# Patient Record
Sex: FEMALE | Race: WHITE
[De-identification: names, ages, dates, MRNs, and addresses within clinical notes are randomized per-mention and may not be internally consistent; named-entity substitution may affect disease eponyms.]

---

## 2017-01-29 ENCOUNTER — HOSPITAL ENCOUNTER (OUTPATIENT)
Dept: HOSPITAL 62 - RAD | Age: 32
End: 2017-01-29
Attending: INTERNAL MEDICINE
Payer: COMMERCIAL

## 2017-01-29 DIAGNOSIS — C85.90: Primary | ICD-10-CM

## 2017-01-29 PROCEDURE — A9552 F18 FDG: HCPCS

## 2017-01-29 PROCEDURE — 78815 PET IMAGE W/CT SKULL-THIGH: CPT

## 2017-04-03 ENCOUNTER — HOSPITAL ENCOUNTER (OUTPATIENT)
Dept: HOSPITAL 62 - OD | Age: 32
End: 2017-04-03
Attending: PHYSICIAN ASSISTANT
Payer: COMMERCIAL

## 2017-04-03 DIAGNOSIS — M25.561: Primary | ICD-10-CM

## 2017-04-03 DIAGNOSIS — D16.21: ICD-10-CM

## 2017-05-08 ENCOUNTER — HOSPITAL ENCOUNTER (EMERGENCY)
Dept: HOSPITAL 62 - ER | Age: 32
Discharge: HOME | End: 2017-05-08
Payer: COMMERCIAL

## 2017-05-08 VITALS — DIASTOLIC BLOOD PRESSURE: 82 MMHG | SYSTOLIC BLOOD PRESSURE: 136 MMHG

## 2017-05-08 DIAGNOSIS — H53.149: ICD-10-CM

## 2017-05-08 DIAGNOSIS — E10.9: ICD-10-CM

## 2017-05-08 DIAGNOSIS — R51: Primary | ICD-10-CM

## 2017-05-08 DIAGNOSIS — F17.200: ICD-10-CM

## 2017-05-08 DIAGNOSIS — J34.89: ICD-10-CM

## 2017-05-08 PROCEDURE — 99284 EMERGENCY DEPT VISIT MOD MDM: CPT

## 2017-05-08 PROCEDURE — 96372 THER/PROPH/DIAG INJ SC/IM: CPT

## 2017-05-08 PROCEDURE — 70450 CT HEAD/BRAIN W/O DYE: CPT

## 2017-05-08 NOTE — ER DOCUMENT REPORT
HPI





- HPI


Patient complains to provider of: head and sinus pressure


Pain Level: 4


Context: 


Patient is a 31-year-old female presents emergency Department complaining of 

sinus pressure and headache for the past 2 months.  Patient states that she has 

seen her dentist as well as an urgent care who put her on 2 different 

antibiotics without any resolution of her symptoms.  Patient states that she 

has not had any fevers, chills, sinus discharge, pain to palpation of her 

sinuses.  Pain is a throbbing pressure worse over the right side of her face.  

She states that she'll take over-the-counter Tylenol or Motrin with minimal 

improvement in her symptoms.  She admits that she is light sensitive.  She 

denies any history of migraines.





PCP is Ahmet veloz





- CARDIOVASCULAR


Cardiovascular: DENIES: Chest pain





- REPRODUCTIVE


Reproductive: DENIES: Pregnant:





- DERM


Skin Color: Normal





Past Medical History





- Social History


Smoking Status: Current Every Day Smoker


Chew tobacco use (# tins/day): Yes


Frequency of alcohol use: None


Drug Abuse: None


Family History: Reviewed & Not Pertinent


Patient has suicidal ideation: No


Patient has homicidal ideation: No


Pulmonary Medical History: 


   Denies: Hx Tuberculosis


Neurological Medical History: Denies: Hx Seizures


Endocrine Medical History: Reports: Hx Diabetes Mellitus Type 1


Renal/ Medical History: Denies: Hx End Stage Renal Disease, Hx Kidney Stones, 

Hx Peritoneal Dialysis


GI Medical History: Reports: Hx Gastroesophageal Reflux Disease


Musculoskeltal Medical History: Reports Hx Gout


Past Surgical History: Reports: Hx Orthopedic Surgery - Back in 2013..  Denies: 

Hx Pacemaker





- Immunizations


Hx Diphtheria, Pertussis, Tetanus Vaccination: Yes





Vertical Provider Document





- CONSTITUTIONAL


Agree With Documented VS: Yes


Exam Limitations: No Limitations


General Appearance: WD/WN, No Apparent Distress


Notes: 


PHYSICAL EXAM


GENERAL: Alert, interacts well. 


HEAD: Normocephalic, atraumatic.


EYES: Pupils equal, round, and reactive to light. Extraocular movements intact.


ENT: Oral mucosa moist, tongue midline. 


NECK: Full range of motion. Supple. Trachea midline.


LUNGS: Clear to auscultation bilaterally, no wheezes, rales, or rhonchi. No 

respiratory distress.


HEART: Regular rate and rhythm. No murmurs, gallops, or rubs.


ABDOMEN: Soft,  nondistended, nontender. No guarding, rebound, or rigidity.. 

Bowel sounds present in all 4 quadrants.


EXTREMITIES: Moves all 4 extremities spontaneously. No edema, radial and 

dorsalis pedis pulses 2/4 bilaterally. No cyanosis. 


NEUROLOGICAL: Alert and oriented x4. Normal speech.


PSYCH: Normal affect, normal mood.


SKIN: Warm, dry, normal turgor. No rashes or lesions noted.





- INFECTION CONTROL


TRAVEL OUTSIDE OF THE [place] IN LAST 30 DAYS: No





- RESPIRATORY


O2 Sat by Pulse Oximetry: 95





Course





- Re-evaluation


Re-evalutation: 





05/08/17 14:58


CT the head without any evidence of hemorrhage, mass, inflammation of the 

sinuses.  Patient given a migraine cocktail which did improve her symptoms.  

Discussed with her to follow-up with her primary care for management and 

possible referral to a neurologist.





- Vital Signs


Vital signs: 


 











Temp Pulse Resp BP Pulse Ox


 


 98.4 F   108 H  16   147/104 H  95 


 


 05/08/17 11:19  05/08/17 11:19  05/08/17 11:19  05/08/17 11:19  05/08/17 11:19














- Diagnostic Test


Radiology reviewed: Image reviewed, Reports reviewed





Discharge





- Discharge


Clinical Impression: 


 Headache





Condition: Good


Disposition: HOME, SELF-CARE


Additional Instructions: 


HEADACHE:





     The physician does not feel that the headache you are experiencing has a 

serious underlying cause.  Most headaches are due to emotional stress, with 

resultant muscle tension (tension headache). Occasionally, headaches are 

secondary to changes in the blood vessels of the scalp (vascular headache and 

migraine headache).  Sometimes, a headache is the first symptom of another 

developing illness, such as a viral infection.  You have no evidence of stroke, 

bleeding, meningitis, or other serious cause of your headache.


     The treatment of headaches varies with the severity and cause of the pain.

  Not all headaches need pain shots.  In fact, there is evidence that using 

narcotics for headaches may make them worse in the long run.  The physician 

will determine the therapy that's in your best interest.


     If you develop a fever, if the headache is different from any you've 

previously experienced, or if the headache progressively worsens, then call 

your physician at once or go to the emergency room.








REGLAN (METOCLOPRAMIDE):


     Reglan has been prescribed.  This medicine affects the stomach and 

intestines.  It can be used to treat nausea and vomiting, to prevent reflux of 

stomach acid up into the esophagus, or to increase the contractions of the 

stomach and intestines.  It is often prescribed for esophagitis, and for 

paralysis of the stomach in diabetics.


     Reglan can cause either mild restlessness or drowsiness.  You should 

contact the doctor at once if you become extremely restless, anxious, or cannot 

sleep, or if you develop uncontrollable motions of the lips, tongue, or jaw.


     Do not take alcohol with this medicine.  Do not drive or operate machinery 

until you have been taking this medicine long enough to know how it affects you.


     Call the doctor if you develop abdominal pains, lightheadedness, black 

stool, or blood in the stool or vomitus.








USE OF DIPHENHYDRAMINE:


     Diphenhydramine (Benadryl) is an antihistamine and has been recommended to 

help treat your headache and to prevent side effects of other medications used 

to treat headaches.  The medication can be repeated four times daily.


     Age         Elixir (12.5 mg/tsp)         25 mg pill


     adult                                     1-2 tabs


     Antihistamines may cause drowsiness, especially with the first dose.  Do 

not operate machinery or drive while under the effects of the medication.  Do 

not combine the medication with alcohol, or with any other medication without 

talking to your doctor.








[place] MEDICATION:


     You have been given a medication to suppress nausea and vomiting. This 

type of medication can be given as a shot, pill, or suppository. It will 

usually last for many hours.  Pills and shots usually last six to eight hours, 

suppositories last about 12 hours.  For the typical illness, only one or two 

doses of the medication may be necessary.


     Mild lightheadedness may occur.  This type of medicine can cause 

drowsiness.  Do not drive or operate dangerous machinery while under its 

influence.  Do not mix with alcohol.


     See your doctor at once if you have muscle spasms or tightness, or 

uncontrollable motions (particularly of the neck, mouth, or jaw). Persistent 

vomiting or severe lightheadedness should also be evaluated by the physician.








TORADOL INJECTION:


     You have been given an injection of ketorolac tromethamine (Toradol).  

This is an excellent, safe drug for pain control.  It also has potent 

antiinflammatory action.  You should have significant pain relief within about 

one hour.


     Toradol is not addicting and is non-sedating.  It does not interfere with 

driving or work.


     Call or return if you develop itching, hives, shortness of breath, or rash.








FOLLOW-UP CARE:


If you have been referred to a physician for follow-up care, call the physician

s office for an appointment as you were instructed or within the next two days.

  If you experience worsening or a significant change in your symptoms, notify 

the physician immediately or return to the Emergency Department at any time for 

re-evaluation.


Prescriptions: 


Butalb/Acetaminophen/Caffeine [Fioricet (-40 mg) Tablet] 1 - 2 tab PO 

Q4HP PRN #20 tab


 PRN Reason: 


Forms:  Elevated Blood Pressure


Referrals: 


AHMET VELOZ PA-C [Primary Care Provider] - Follow up in 3-5 days

## 2017-07-04 ENCOUNTER — HOSPITAL ENCOUNTER (EMERGENCY)
Dept: HOSPITAL 62 - ER | Age: 32
Discharge: HOME | End: 2017-07-04
Payer: COMMERCIAL

## 2017-07-04 VITALS — DIASTOLIC BLOOD PRESSURE: 87 MMHG | SYSTOLIC BLOOD PRESSURE: 138 MMHG

## 2017-07-04 DIAGNOSIS — T46.4X6A: ICD-10-CM

## 2017-07-04 DIAGNOSIS — E10.65: ICD-10-CM

## 2017-07-04 DIAGNOSIS — N92.1: Primary | ICD-10-CM

## 2017-07-04 DIAGNOSIS — F17.200: ICD-10-CM

## 2017-07-04 DIAGNOSIS — R10.30: ICD-10-CM

## 2017-07-04 DIAGNOSIS — T38.3X6A: ICD-10-CM

## 2017-07-04 DIAGNOSIS — Z91.14: ICD-10-CM

## 2017-07-04 DIAGNOSIS — Z71.6: ICD-10-CM

## 2017-07-04 LAB
ALBUMIN SERPL-MCNC: 4 G/DL (ref 3.5–5)
ALP SERPL-CCNC: 61 U/L (ref 38–126)
ALT SERPL-CCNC: 45 U/L (ref 9–52)
ANION GAP SERPL CALC-SCNC: 14 MMOL/L (ref 5–19)
APPEARANCE UR: (no result)
AST SERPL-CCNC: 61 U/L (ref 14–36)
BASOPHILS # BLD AUTO: 0.1 10^3/UL (ref 0–0.2)
BASOPHILS NFR BLD AUTO: 1.1 % (ref 0–2)
BILIRUB DIRECT SERPL-MCNC: 0.5 MG/DL (ref 0–0.4)
BILIRUB SERPL-MCNC: 0.7 MG/DL (ref 0.2–1.3)
BILIRUB UR QL STRIP: NEGATIVE
BUN SERPL-MCNC: 10 MG/DL (ref 7–20)
CALCIUM: 9.7 MG/DL (ref 8.4–10.2)
CHLORIDE SERPL-SCNC: 101 MMOL/L (ref 98–107)
CO2 SERPL-SCNC: 20 MMOL/L (ref 22–30)
CREAT SERPL-MCNC: 0.47 MG/DL (ref 0.52–1.25)
EOSINOPHIL # BLD AUTO: 0.2 10^3/UL (ref 0–0.6)
EOSINOPHIL NFR BLD AUTO: 2 % (ref 0–6)
ERYTHROCYTE [DISTWIDTH] IN BLOOD BY AUTOMATED COUNT: 13.1 % (ref 11.5–14)
GLUCOSE SERPL-MCNC: 273 MG/DL (ref 75–110)
GLUCOSE UR STRIP-MCNC: >=500 MG/DL
HCT VFR BLD CALC: 44.6 % (ref 36–47)
HGB BLD-MCNC: 15.1 G/DL (ref 12–15.5)
HGB HCT DIFFERENCE: 0.7
KETONES UR STRIP-MCNC: (no result) MG/DL
LYMPHOCYTES # BLD AUTO: 3.7 10^3/UL (ref 0.5–4.7)
LYMPHOCYTES NFR BLD AUTO: 35.1 % (ref 13–45)
MCH RBC QN AUTO: 31.1 PG (ref 27–33.4)
MCHC RBC AUTO-ENTMCNC: 33.8 G/DL (ref 32–36)
MCV RBC AUTO: 92 FL (ref 80–97)
MONOCYTES # BLD AUTO: 0.6 10^3/UL (ref 0.1–1.4)
MONOCYTES NFR BLD AUTO: 5.2 % (ref 3–13)
NEUTROPHILS # BLD AUTO: 6 10^3/UL (ref 1.7–8.2)
NEUTS SEG NFR BLD AUTO: 56.6 % (ref 42–78)
NITRITE UR QL STRIP: NEGATIVE
PH UR STRIP: 5 [PH] (ref 5–9)
POTASSIUM SERPL-SCNC: 4.3 MMOL/L (ref 3.6–5)
PROT SERPL-MCNC: 7.3 G/DL (ref 6.3–8.2)
PROT UR STRIP-MCNC: 100 MG/DL
RBC # BLD AUTO: 4.84 10^6/UL (ref 3.72–5.28)
SODIUM SERPL-SCNC: 135.4 MMOL/L (ref 137–145)
SP GR UR STRIP: 1.03
UROBILINOGEN UR-MCNC: NEGATIVE MG/DL (ref ?–2)
WBC # BLD AUTO: 10.7 10^3/UL (ref 4–10.5)

## 2017-07-04 PROCEDURE — 85025 COMPLETE CBC W/AUTO DIFF WBC: CPT

## 2017-07-04 PROCEDURE — 80053 COMPREHEN METABOLIC PANEL: CPT

## 2017-07-04 PROCEDURE — 81001 URINALYSIS AUTO W/SCOPE: CPT

## 2017-07-04 PROCEDURE — 99284 EMERGENCY DEPT VISIT MOD MDM: CPT

## 2017-07-04 PROCEDURE — 81025 URINE PREGNANCY TEST: CPT

## 2017-07-04 PROCEDURE — 36415 COLL VENOUS BLD VENIPUNCTURE: CPT

## 2017-07-04 NOTE — ER DOCUMENT REPORT
ED Medical Screen (RME)





- General


Chief Complaint: Vaginal Bleeding


Stated Complaint: WEAKNESS


Time Seen by Provider: 07/04/17 17:28


Notes: 


Patient is a 31-year-old female, past medical history irregular periods, 

diabetes, presents with several days of heavy vaginal bleeding, passing clots 

and abdominal cramping.  Her last menstrual period was 3 months ago.  She has 

never had this before and never required a blood transfusion. She is starting 

to feel lightheaded now.





PE: NAD. RRR. Non-tender abdomen.





I have greeted and performed a rapid initial assessment of this patient.  A 

comprehensive ED assessment and evaluation of the patient, analysis of test 

results and completion of the medical decision making process will be conducted 

by additional ED providers.


TRAVEL OUTSIDE OF THE [place] IN LAST 30 DAYS: No





- Related Data


Allergies/Adverse Reactions: 


 





No Known Allergies Allergy (Verified 05/08/17 11:18)


 











Past Medical History


Pulmonary Medical History: 


   Denies: Hx Tuberculosis


Neurological Medical History: Denies: Hx Seizures


Endocrine Medical History: Reports: Hx Diabetes Mellitus Type 1


Renal/ Medical History: Denies: Hx End Stage Renal Disease, Hx Kidney Stones, 

Hx Peritoneal Dialysis


GI Medical History: Reports: Hx Gastroesophageal Reflux Disease


Musculoskeltal Medical History: Reports Hx Gout


Past Surgical History: Reports: Hx Orthopedic Surgery - Back in 2013..  Denies: 

Hx Pacemaker





- Immunizations


Hx Diphtheria, Pertussis, Tetanus Vaccination: Yes





Physical Exam





- Vital signs


Vitals: 





 











Temp Pulse Resp BP Pulse Ox


 


 98.2 F   86   14   142/91 H  96 


 


 07/04/17 17:07  07/04/17 17:07  07/04/17 17:07  07/04/17 17:07  07/04/17 17:07














Course





- Vital Signs


Vital signs: 





 











Temp Pulse Resp BP Pulse Ox


 


 98.2 F   86   14   142/91 H  96 


 


 07/04/17 17:07  07/04/17 17:07  07/04/17 17:07  07/04/17 17:07  07/04/17 17:07

## 2017-07-04 NOTE — ER DOCUMENT REPORT
ED General





- General


Chief Complaint: Vaginal Bleeding


Stated Complaint: WEAKNESS


Time Seen by Provider: 07/04/17 17:28


Mode of Arrival: Ambulatory


Information source: Patient


TRAVEL OUTSIDE OF THE [place] IN LAST 30 DAYS: No





- HPI


Notes: 


Patient presents to ER A&O x 4 with complaint menstrual bleeding x 2 weeks 

increasing over last four days. Reports blood clots last four days. Reports hx 

of irregular periods. Denies hx of anemia. Breaths even and unlabored. Speaking 

in clear and complete sentences. NAD noted. Reports mild lower abdominal 

cramping.





Patient also reports that she is noncompliant with her diabetic medications 

including metformin, Lantus, Actos, Victoza, lisinopril.  She was counseled at 

length about the need to take these diabetic medications in the complication 

she would suffer she did not.  She is also instructed to quit smoking.








- Related Data


Allergies/Adverse Reactions: 


 





No Known Allergies Allergy (Verified 05/08/17 11:18)


 











Past Medical History





- Social History


Smoking Status: Current Every Day Smoker


Smoking Education Provided: Yes


Frequency of alcohol use: None


Drug Abuse: None


Lives with: Family


Family History: Reviewed & Not Pertinent


Patient has suicidal ideation: No


Patient has homicidal ideation: No


Pulmonary Medical History: 


   Denies: Hx Tuberculosis


Neurological Medical History: Denies: Hx Seizures


Endocrine Medical History: Reports: Hx Diabetes Mellitus Type 1


Renal/ Medical History: Denies: Hx End Stage Renal Disease, Hx Kidney Stones, 

Hx Peritoneal Dialysis


GI Medical History: Reports: Hx Gastroesophageal Reflux Disease


Musculoskeltal Medical History: Reports Hx Gout


Past Surgical History: Reports: Hx Orthopedic Surgery - Back in 2013..  Denies: 

Hx Pacemaker





- Immunizations


Hx Diphtheria, Pertussis, Tetanus Vaccination: Yes





Review of Systems





- Review of Systems


Notes: 


REVIEW OF SYSTEMS:


CONSTITUTIONAL :  Denies fever,  chills, or sweats.  Denies recent illness.


EENT:   Denies eye, ear, throat, or mouth pain or symptoms.  Denies nasal or 

sinus congestion or discharge.  Denies throat, tongue, or mouth swelling or 

difficulty swallowing.


CARDIOVASCULAR:  Denies chest pain.  Denies palpitations or racing or irregular 

heart beat.  Denies ankle edema.


RESPIRATORY:  Denies cough, cold, or chest congestion.  Denies shortness of 

breath, difficulty breathing, or wheezing.


GASTROINTESTINAL:  Denies abdominal pain or distention.  Denies nausea, vomiting

, or diarrhea.  Denies blood in vomitus, stools, or per rectum.  Denies black, 

tarry stools.  Denies constipation. 


GENITOURINARY:  Denies difficulty urinating, painful urination, burning, 

frequency, blood in urine, or discharge.


FEMALE  GENITOURINARY:   Denies vaginal discharge or odor. 


MUSCULOSKELETAL:  Denies back or neck pain or stiffness.  Denies joint pain or 

swelling.


SKIN:   Denies rash, lesions or sores.


HEMATOLOGIC :   Denies easy bruising or bleeding.


LYMPHATIC:  Denies swollen, enlarged glands.


NEUROLOGICAL:  Denies confusion or altered mental status.  Denies passing out 

or loss of consciousness.  Denies dizziness or lightheadedness.  Denies 

headache.  Denies weakness or paralysis or loss of use of either side.  Denies 

problems with gait or speech.  Denies sensory loss, numbness, or tingling.  

Denies seizures.


PSYCHIATRIC:  Denies anxiety or stress.  Denies depression, suicidal ideation, 

or homicidal ideation.





ALL OTHER SYSTEMS REVIEWED AND NEGATIVE.








Dictation was performed using Dragon voice recognition software











Physical Exam





- Vital signs


Vitals: 


 











Temp Pulse Resp BP Pulse Ox


 


 98.2 F   86   14   142/91 H  96 


 


 07/04/17 17:07  07/04/17 17:07  07/04/17 17:07  07/04/17 17:07  07/04/17 17:07














- Notes


Notes: 


PHYSICAL EXAMINATION:





GENERAL: Well-appearing, well-nourished and in no acute distress.





HEAD: Atraumatic, normocephalic.





EYES: Pupils equal round and reactive to light, extraocular movements intact, 

conjunctiva are normal.





ENT: Nares patent, oropharynx clear without exudates.  Moist mucous membranes.





NECK: Normal range of motion, supple without lymphadenopathy





LUNGS: Breath sounds clear to auscultation bilaterally and equal.  No wheezes 

rales or rhonchi.





HEART: Regular rate and rhythm without murmurs





ABDOMEN: Soft, nontender, nondistended abdomen.  No guarding, no rebound.  No 

masses appreciated.  Obese. 





Female : deferred





Musculoskeletal: Normal range of motion.  No cyanosis.  Trace bilateral lower 

extremity edema.  Negative Homans.  No palpable cord.





NEUROLOGICAL: Cranial nerves grossly intact.  Normal speech, normal gait.  

Normal sensory, motor exams





PSYCH: Normal mood, normal affect.





SKIN: Warm, Dry, normal turgor, no rashes or lesions noted.  No skin breakdown.





Course





- Re-evaluation


Re-evalutation: 


07/04/17 19:11


Length about the need to control her diabetes.  She was given approximately 1 L 

of fluid to drink with water.





She was given Provera and Norco.





No evidence for anemia or pregnancy or electrolyte imbalance or renal 

insufficiency or significant infection or urinary tract infection or suggestion 

for significant ketoacidosis.


07/04/17 19:11








- Vital Signs


Vital signs: 


 











Temp Pulse Resp BP Pulse Ox


 


 98.2 F   86   14   142/91 H  96 


 


 07/04/17 17:07  07/04/17 17:07  07/04/17 17:07  07/04/17 17:07  07/04/17 17:07














- Laboratory


Result Diagrams: 


 07/04/17 17:37





 07/04/17 17:37


Laboratory results interpreted by me: 


 











  07/04/17 07/04/17 07/04/17





  17:37 17:37 17:45


 


WBC  10.7 H  


 


Sodium   135.4 L 


 


Carbon Dioxide   20 L 


 


Creatinine   0.47 L 


 


Glucose   273 H 


 


Direct Bilirubin   0.5 H 


 


AST   61 H 


 


Urine Protein    100 H


 


Urine Glucose (UA)    >=500 H


 


Urine Ketones    TRACE H


 


Urine Blood    LARGE H














Discharge





- Discharge


Clinical Impression: 


 Hyperglycemia, Medically noncompliant





Menorrhagia


Qualifiers:


 Menorrahagia type: with irregular cycle Qualified Code(s): N92.1 - Excessive 

and frequent menstruation with irregular cycle





Condition: Stable


Disposition: HOME, SELF-CARE


Instructions:  Menorrhagia ([facility]), Hyperglycemia ([facility]), Stop Smoking ([facility])


Additional Instructions: 


Drink plenty  of water.  Watch your diet closely.





Return to the emergency department in case of fever, severe bleeding or 

vomiting.





Follow-up with your regular practitioner to inquire about starting birth 

control pills to regulate your menses.


Prescriptions: 


Tramadol HCl [Ultram 50 mg Tablet] 50 mg PO Q4HP PRN #20 tablet


 PRN Reason: 


Ibuprofen [Motrin 800 mg Tablet] 800 mg PO Q8H PRN #30 tab


 PRN Reason: 


Medroxyprogesterone Acet [Provera 10 Mg Tablet] 10 mg PO DAILY #10 tablet


Forms:  Smoking Cessation Education

## 2018-01-08 ENCOUNTER — HOSPITAL ENCOUNTER (EMERGENCY)
Dept: HOSPITAL 62 - ER | Age: 33
Discharge: HOME | End: 2018-01-08
Payer: SELF-PAY

## 2018-01-08 VITALS — DIASTOLIC BLOOD PRESSURE: 87 MMHG | SYSTOLIC BLOOD PRESSURE: 147 MMHG

## 2018-01-08 DIAGNOSIS — I10: ICD-10-CM
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
## 2018-01-08 DIAGNOSIS — F17.210: ICD-10-CM

## 2018-01-08 DIAGNOSIS — M25.561: ICD-10-CM

## 2018-01-08 DIAGNOSIS — E11.9: ICD-10-CM

## 2018-01-08 DIAGNOSIS — D16.22: Primary | ICD-10-CM

## 2018-01-08 PROCEDURE — 99406 BEHAV CHNG SMOKING 3-10 MIN: CPT

## 2018-01-08 PROCEDURE — L1830 KO IMMOB CANVAS LONG PRE OTS: HCPCS

## 2018-01-08 PROCEDURE — 73564 X-RAY EXAM KNEE 4 OR MORE: CPT

## 2018-01-08 PROCEDURE — 96372 THER/PROPH/DIAG INJ SC/IM: CPT

## 2018-01-08 PROCEDURE — 99283 EMERGENCY DEPT VISIT LOW MDM: CPT

## 2018-01-08 NOTE — RADIOLOGY REPORT (SQ)
EXAM DESCRIPTION:  KNEE RIGHT 4 VIEWS



COMPLETED DATE/TIME:  1/8/2018 4:41 pm



REASON FOR STUDY:  popping feelling causing her to fall pain in knee



COMPARISON:  None.



NUMBER OF VIEWS:  Four views.



TECHNIQUE:  AP, lateral, and both oblique radiographic images acquired of the right knee.



LIMITATIONS:  None.



FINDINGS:  MINERALIZATION: Normal.

BONES: No acute fracture or dislocation.  There is a small focal projection of bone extending inferio
rly at the level of the proximal metaphysis of the tibia medially most consistent with a small osteoc
hondroma.

JOINT: No effusion.

SOFT TISSUES: No soft tissue swelling.  No radio-opaque foreign body.

OTHER: No other significant finding.



IMPRESSION:  Findings consistent with a small osteochondroma involving the proximal tibia as noted ab
ove. NO RADIOGRAPHIC EVIDENCE OF ACUTE INJURY.



TECHNICAL DOCUMENTATION:  JOB ID:  [id number]

 2011 [employer]- All Rights Reserved

## 2018-01-08 NOTE — ER DOCUMENT REPORT
ED Extremity Problem, Lower





- General


Chief Complaint: Knee Pain


Stated Complaint: RIGHT KNEE PAIN


Time Seen by Provider: 01/08/18 16:00


Mode of Arrival: Ambulatory


Information source: Patient


Notes: 





32-year-old female presents to ED for complaint of popping in her right knee.  

She states that on Thursday she was horsing around and heard and felt a pop in 

her knee causing her to fall to the ground.  Patient states that her  

was able to get her to the chair.  Patient states she can walk as long as she 

walks straight but if she tries to turn or to twist on her leg that it feels 

like it is popping out of joint and it is extremely painful.  She states is 

very painful to bend.


TRAVEL OUTSIDE OF THE [place] IN LAST 30 DAYS: No





- HPI


Patient complains to provider of: Injury, Pain


Location: Knee - Right


Occurred: Other - Thursday


Where: Home, Outdoors


Onset/Duration: Intermittent


Quality of pain: Sharp, Throbbing


Severity: Moderate


Pain Level: 4


Context: Twisted


Recent injury: Possibly


Associated symptoms: Eagle a pop, Painful ambulation


Exacerbated by: Movement, Walking


Relieved by: Elevation, Ice, Rest





- Related Data


Allergies/Adverse Reactions: 


 





No Known Allergies Allergy (Verified 05/08/17 11:18)


 











Past Medical History





- General


Information source: Patient





- Social History


Smoking Status: Current Every Day Smoker


Cigarette use (# per day): Yes - Pack per day


Chew tobacco use (# tins/day): No


Smoking Education Provided: Yes - 4 minutes


Frequency of alcohol use: None


Drug Abuse: None


Occupation: None


Lives with: Family


Family History: Arthritis, DM, Hypertension, Malignancy.  denies: CAD, COPD, CVA

, Hyperlipidemia, Thyroid Disfunction


Patient has suicidal ideation: No


Patient has homicidal ideation: No





- Past Medical History


Cardiac Medical History: Reports: None


Pulmonary Medical History: Reports: None


EENT Medical History: Reports: None


Neurological Medical History: Reports: None


Endocrine Medical History: Reports: Hx Diabetes Mellitus Type 2


Renal/ Medical History: Reports: None


Malignancy Medical History: Reports: None


GI Medical History: Reports: Hx Gastroesophageal Reflux Disease


Musculoskeltal Medical History: Reports Hx Arthritis, Reports Hx Gout, Reports 

Hx Musculoskeletal Deformity, Reports Hx Musculoskeletal Trauma


Skin Medical History: Reports None


Psychiatric Medical History: Reports: None


Traumatic Medical History: Reports: Hx Fractures - Humerus


Infectious Medical History: Reports: None


Past Surgical History: Reports: Hx Oral Surgery - [place] teeth, Hx Orthopedic 

Surgery - Back in 2013.  Fractured humerus repair fatty tumor from right leg





- Immunizations


Immunizations up to date: Yes


Hx Diphtheria, Pertussis, Tetanus Vaccination: Yes





Review of Systems





- Review of Systems


Constitutional: No symptoms reported


EENT: No symptoms reported


Cardiovascular: No symptoms reported


Respiratory: No symptoms reported


Gastrointestinal: No symptoms reported


Genitourinary: No symptoms reported


Female Genitourinary: No symptoms reported


Musculoskeletal: Joint pain - Right knee.  denies: Joint swelling


Skin: No symptoms reported


Hematologic/Lymphatic: No symptoms reported


Neurological/Psychological: No symptoms reported


-: Yes All other systems reviewed and negative





Physical Exam





- Vital signs


Vitals: 


 











Temp Pulse Resp BP Pulse Ox


 


 98.8 F   108 H  18   134/90 H  96 


 


 01/08/18 15:21  01/08/18 15:21  01/08/18 15:21  01/08/18 15:21  01/08/18 15:21











Interpretation: Normal





- General


General appearance: Appears well, Alert





- HEENT


Head: Normocephalic, Atraumatic


Eyes: Normal


Pupils: PERRL





- Respiratory


Respiratory status: No respiratory distress


Chest status: Nontender


Breath sounds: Normal


Chest palpation: Normal





- Cardiovascular


Rhythm: Regular


Heart sounds: Normal auscultation


Murmur: No





- Abdominal


Inspection: Normal


Distension: No distension


Bowel sounds: Normal


Tenderness: Nontender


Organomegaly: No organomegaly





- Back


Back: Normal, Nontender





- Extremities


General upper extremity: Normal inspection, Nontender, Normal color, Normal ROM

, Normal temperature


General lower extremity: Normal inspection, Normal color, Normal temperature.  

No: Kanchan's sign


Knee: Tender, Pain with ROM, Popliteal fossa tender, Tender joint line.  No: 

Abrasion, Deformity, Dislocation, Drawer's test instability, Ecchymosis, 

Instability, Joint effusion, Laceration, Laxity with valgus stress, Laxity with 

varus stress, Patellar tendon intact, Unable to bear weight





- Neurological


Neuro grossly intact: Yes


Cognition: Normal


Orientation: AAOx4


[place] Coma Scale Eye Opening: Spontaneous


Shania Coma Scale Verbal: Oriented


Shania Coma Scale Motor: Obeys Commands


Shania Coma Scale Total: 15


Speech: Normal


Motor strength normal: LUE, RUE, LLE, RLE


Sensory: Normal





- Psychological


Associated symptoms: Normal affect, Normal mood





- Skin


Skin Temperature: Warm


Skin Moisture: Dry


Skin Color: Normal





Course





- Re-evaluation


Re-evalutation: 





01/08/18 20:36


X-ray discussed with patient and written report given to patient.  Patient 

instructed to follow-up with orthopedics.  Patient was given a Toradol 

injection in the ED for her pain.  Patient instructed to use ibuprofen over-the-

counter.





- Vital Signs


Vital signs: 


 











Temp Pulse Resp BP Pulse Ox


 


 98.0 F   92   16   147/87 H  96 


 


 01/08/18 17:55  01/08/18 17:55  01/08/18 17:55  01/08/18 17:55  01/08/18 17:55














- Diagnostic Test


Radiology reviewed: Image reviewed, Reports reviewed





Procedures





- Immobilization


  ** Left Knee


Time completed: 17:30


Immobilizer type: Crutches, Knee immobilizer


Performed by: PCT


Post-Proc Neuro Vasc Exam: Normal


Alignment checked and good: Yes





Discharge





- Discharge


Clinical Impression: 


 osteochondroma proximal tibia





HTN (hypertension)


Qualifiers:


 Hypertension type: unspecified Qualified Code(s): I10 - Essential (primary) 

hypertension





Condition: Stable


Disposition: HOME, SELF-CARE


Instructions:  Family Physicians / Practices, Use of Over-The-Counter Ibuprofen 

([facility])


Additional Instructions: 


Your x-ray shows a small osteochondroma to the proximal tibia.  


This is not an acute finding.  


This is something that she will need to follow-up with the orthopedics 

concerning.





KNEE IMMOBILIZING SPLINT:


     The knee immobilizing splint will protect the injury while healing begins.

  This type of splint does not allow the knee to bend at all.  No running or 

sports will be possible.


     If the splint allows painfree walking, it's giving adequate protection.  

If there is still significant pain, crutches may be needed as well.  Don't do 

anything that hurts.


     Adjusted the splint, if necessary.  The stiffeners on the sides are 

attached with Velcro, so they can be easily moved to adjust for thigh and calf 

size.  If you need help with these adjustments, come back.


     You will lose muscle strength in the thigh while using this splint.  The 

doctor will advise you if it's safe to do isometric knee exercises while you 

use it.








USE OF CRUTCHES:


     The doctor has recommended that you not bear weight at this time. You will 

need to use crutches.  Adjust the crutches so the tops come to about two inches 

under the armpit while you are standing upright.  Use your hands -- not your 

armpits -- to support your weight.


     To get into a chair, support yourself with one crutch on the injured side.

  Hold the chair with the other hand, then lower yourself while putting all 

your weight on the good leg.


     Going up stairs is `good leg up, step up, then bring up crutches and bad 

leg.'  Down stairs is `bad leg and crutches down, then bring good leg down.'


     If you develop numbness or swelling in an arm or hand, you are using the 

crutches incorrectly.  Return if you are having any problems with the crutches.








ICE & ELEVATION:


     Apply ice packs frequently against the painful area.  Many different 

schedules are recommended, such as "20 minutes on, 20 minutes off" or "one hour 

ice, two hours rest."  If you need to work, you may need to go longer between 

ice treatments.  You should plan to have the area ice packed AT LEAST one-

fourth of the time.


     The ice should be applied over the wrap, tape, or splint, or over a layer 

of cloth -- not directly against the skin.  Some ice bags have a built-in cloth 

and can be put directly on the skin.


     Your injured part should be elevated as much as possible over the next 48 

hours.  Try to keep the injury above the level of the heart. Avoid use of the 

injured area.  Elevation and rest will decrease the swelling.








USE OF OVER-THE-COUNTER IBUPROFEN:


     Ibuprofen (Advil, Nuprin, Medipren, Motrin IB) is a medication for fever 

and pain control.  In addition, it has anti- inflammatory effects which may be 

beneficial, especially in the treatment of injuries.


     It's best to take ibuprofen with food.  Persons with ulcer disease or 

allergy to aspirin should notify their physician of this before taking 

ibuprofen.


     Ibuprofen can be given every four to six hours, for a total of four doses 

daily.


     Age              Pain or fever dose          Antiinflammatory dose


     6-8 yr              200 mg (1 tab)                200 mg (1 tab)


     9-11 yr             200 mg (1 tab)                200-400 mg (1-2 tab)


     11-14 yr            200-400 mg (1-2 tab)         400 mg (2 tab)


     15-adult            400 mg (2 tab)                600 mg (3 tab)





FOLLOW-UP CARE:


If you have been referred to a physician for follow-up care, call the physician

s office for an appointment as you were instructed or within the next two days.

  If you experience worsening or a significant change in your symptoms, notify 

the physician immediately or return to the Emergency Department at any time for 

re-evaluation.


Forms:  Elevated Blood Pressure, Smoking Cessation Education


Referrals: 


VIANEY CAMPOVERDE,  [ACTIVE STAFF] - Follow up as needed

## 2018-02-12 ENCOUNTER — HOSPITAL ENCOUNTER (INPATIENT)
Dept: HOSPITAL 62 - ER | Age: 33
LOS: 2 days | Discharge: HOME | DRG: 348 | End: 2018-02-14
Attending: SURGERY
Payer: COMMERCIAL

## 2018-02-12 DIAGNOSIS — Z79.4: ICD-10-CM

## 2018-02-12 DIAGNOSIS — Z82.61: ICD-10-CM

## 2018-02-12 DIAGNOSIS — T36.8X5A: ICD-10-CM

## 2018-02-12 DIAGNOSIS — E11.9: ICD-10-CM

## 2018-02-12 DIAGNOSIS — M10.9: ICD-10-CM

## 2018-02-12 DIAGNOSIS — Z87.81: ICD-10-CM

## 2018-02-12 DIAGNOSIS — Z82.49: ICD-10-CM

## 2018-02-12 DIAGNOSIS — K61.0: Primary | ICD-10-CM

## 2018-02-12 DIAGNOSIS — M19.90: ICD-10-CM

## 2018-02-12 DIAGNOSIS — K21.9: ICD-10-CM
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
## 2018-02-12 DIAGNOSIS — F17.210: ICD-10-CM

## 2018-02-12 DIAGNOSIS — E66.01: ICD-10-CM

## 2018-02-12 DIAGNOSIS — Z80.9: ICD-10-CM

## 2018-02-12 LAB
ADD MANUAL DIFF: NO
ALBUMIN SERPL-MCNC: 4.6 G/DL (ref 3.5–5)
ALP SERPL-CCNC: 60 U/L (ref 38–126)
ALT SERPL-CCNC: 31 U/L (ref 9–52)
ANION GAP SERPL CALC-SCNC: 11 MMOL/L (ref 5–19)
AST SERPL-CCNC: 23 U/L (ref 14–36)
BASE EXCESS BLDV CALC-SCNC: 1 MMOL/L
BASOPHILS # BLD AUTO: 0.2 10^3/UL (ref 0–0.2)
BASOPHILS NFR BLD AUTO: 1.2 % (ref 0–2)
BILIRUB DIRECT SERPL-MCNC: 0.3 MG/DL (ref 0–0.4)
BILIRUB SERPL-MCNC: 1 MG/DL (ref 0.2–1.3)
BUN SERPL-MCNC: 5 MG/DL (ref 7–20)
CALCIUM: 9.4 MG/DL (ref 8.4–10.2)
CHLORIDE SERPL-SCNC: 99 MMOL/L (ref 98–107)
CO2 SERPL-SCNC: 26 MMOL/L (ref 22–30)
EOSINOPHIL # BLD AUTO: 0.1 10^3/UL (ref 0–0.6)
EOSINOPHIL NFR BLD AUTO: 0.7 % (ref 0–6)
ERYTHROCYTE [DISTWIDTH] IN BLOOD BY AUTOMATED COUNT: 12.7 % (ref 11.5–14)
GLUCOSE SERPL-MCNC: 264 MG/DL (ref 75–110)
HCO3 BLDV-SCNC: 25.9 MMOL/L (ref 20–32)
HCT VFR BLD CALC: 43.7 % (ref 36–47)
HGB BLD-MCNC: 15.1 G/DL (ref 12–15.5)
LYMPHOCYTES # BLD AUTO: 3.6 10^3/UL (ref 0.5–4.7)
LYMPHOCYTES NFR BLD AUTO: 20.2 % (ref 13–45)
MCH RBC QN AUTO: 30.9 PG (ref 27–33.4)
MCHC RBC AUTO-ENTMCNC: 34.6 G/DL (ref 32–36)
MCV RBC AUTO: 89 FL (ref 80–97)
MONOCYTES # BLD AUTO: 0.9 10^3/UL (ref 0.1–1.4)
MONOCYTES NFR BLD AUTO: 5.3 % (ref 3–13)
NEUTROPHILS # BLD AUTO: 12.7 10^3/UL (ref 1.7–8.2)
NEUTS SEG NFR BLD AUTO: 72.6 % (ref 42–78)
PCO2 BLDV: 42.3 MMHG (ref 35–63)
PH BLDV: 7.41 [PH] (ref 7.3–7.42)
PLATELET # BLD: 220 10^3/UL (ref 150–450)
POTASSIUM SERPL-SCNC: 4.2 MMOL/L (ref 3.6–5)
PROT SERPL-MCNC: 7.3 G/DL (ref 6.3–8.2)
RBC # BLD AUTO: 4.91 10^6/UL (ref 3.72–5.28)
SODIUM SERPL-SCNC: 135.9 MMOL/L (ref 137–145)
TOTAL CELLS COUNTED % (AUTO): 100 %
WBC # BLD AUTO: 17.6 10^3/UL (ref 4–10.5)

## 2018-02-12 PROCEDURE — 82962 GLUCOSE BLOOD TEST: CPT

## 2018-02-12 PROCEDURE — 96365 THER/PROPH/DIAG IV INF INIT: CPT

## 2018-02-12 PROCEDURE — A6266 IMPREG GAUZE NO H20/SAL/YARD: HCPCS

## 2018-02-12 PROCEDURE — 87075 CULTR BACTERIA EXCEPT BLOOD: CPT

## 2018-02-12 PROCEDURE — 82803 BLOOD GASES ANY COMBINATION: CPT

## 2018-02-12 PROCEDURE — 99285 EMERGENCY DEPT VISIT HI MDM: CPT

## 2018-02-12 PROCEDURE — 96368 THER/DIAG CONCURRENT INF: CPT

## 2018-02-12 PROCEDURE — 87070 CULTURE OTHR SPECIMN AEROBIC: CPT

## 2018-02-12 PROCEDURE — 84703 CHORIONIC GONADOTROPIN ASSAY: CPT

## 2018-02-12 PROCEDURE — 87040 BLOOD CULTURE FOR BACTERIA: CPT

## 2018-02-12 PROCEDURE — 87205 SMEAR GRAM STAIN: CPT

## 2018-02-12 PROCEDURE — 87077 CULTURE AEROBIC IDENTIFY: CPT

## 2018-02-12 PROCEDURE — 85025 COMPLETE CBC W/AUTO DIFF WBC: CPT

## 2018-02-12 PROCEDURE — 36415 COLL VENOUS BLD VENIPUNCTURE: CPT

## 2018-02-12 PROCEDURE — 80053 COMPREHEN METABOLIC PANEL: CPT

## 2018-02-12 PROCEDURE — 72193 CT PELVIS W/DYE: CPT

## 2018-02-12 PROCEDURE — 96375 TX/PRO/DX INJ NEW DRUG ADDON: CPT

## 2018-02-12 PROCEDURE — 83605 ASSAY OF LACTIC ACID: CPT

## 2018-02-12 PROCEDURE — 87186 SC STD MICRODIL/AGAR DIL: CPT

## 2018-02-13 PROCEDURE — 0D9Q0ZZ DRAINAGE OF ANUS, OPEN APPROACH: ICD-10-PCS | Performed by: SURGERY

## 2018-02-13 RX ADMIN — INSULIN LISPRO PRN UNIT: 100 INJECTION, SOLUTION INTRAVENOUS; SUBCUTANEOUS at 23:07

## 2018-02-13 RX ADMIN — OXYCODONE AND ACETAMINOPHEN PRN TAB: 5; 325 TABLET ORAL at 22:04

## 2018-02-13 RX ADMIN — METRONIDAZOLE SCH ML: 500 INJECTION, SOLUTION INTRAVENOUS at 22:59

## 2018-02-13 RX ADMIN — METFORMIN HYDROCHLORIDE SCH MG: 500 TABLET, FILM COATED ORAL at 22:04

## 2018-02-13 NOTE — ER DOCUMENT REPORT
ED General





- General


Chief Complaint: Abscess


Stated Complaint: POSSIBLE ABSCESS


Time Seen by Provider: 02/12/18 19:56


Notes: 





Patient is a 32-year-old female presents with complaint of an abscess.  Sepsis 

no her rectum.  She has had abscess before but more in the abdominal wall.  She 

is a diabetic.  Said her symptoms have been worsening over last 2-3 days.  

Subjective fevers at home.  No vomiting.  No diarrhea.  No other complaints at 

this time.


TRAVEL OUTSIDE OF THE [place] IN LAST 30 DAYS: No





- Related Data


Allergies/Adverse Reactions: 


 





No Known Allergies Allergy (Verified 05/08/17 11:18)


 











Past Medical History





- Social History


Smoking Status: Current Every Day Smoker


Chew tobacco use (# tins/day): No


Frequency of alcohol use: None


Drug Abuse: None


Family History: Arthritis, DM, Hypertension, Malignancy.  denies: CAD, COPD, CVA

, Hyperlipidemia, Thyroid Disfunction


Patient has suicidal ideation: No


Patient has homicidal ideation: No


Endocrine Medical History: Reports: Hx Diabetes Mellitus Type 2


Renal/ Medical History: Denies: Hx Peritoneal Dialysis


GI Medical History: Reports: Hx Gastroesophageal Reflux Disease


Musculoskeltal Medical History: Reports Hx Arthritis, Reports Hx Gout, Reports 

Hx Musculoskeletal Deformity, Reports Hx Musculoskeletal Trauma


Traumatic Medical History: Reports: Hx Fractures - Humerus


Past Surgical History: Reports: Hx Oral Surgery - [place] teeth, Hx Orthopedic 

Surgery - Back in 2013.  Fractured humerus repair fatty tumor from right leg





- Immunizations


Immunizations up to date: Yes


Hx Diphtheria, Pertussis, Tetanus Vaccination: Yes





Review of Systems





- Review of Systems


Notes: 





My Normal Review Basic





REVIEW OF SYSTEMS:


CONSTITUTIONAL : Fevers.


RESPIRATORY:  Denies cough, cold, or chest congestion.  Denies shortness of 

breath, difficulty breathing, or wheezing.


GASTROINTESTINAL:  Denies abdominal pain.  Denies nausea, vomiting, or 

diarrhea.  Denies constipation.  Last BM: 


GENITOURINARY:  Denies difficulty urinating, painful urination, burning, 

frequency, or blood in urine.


MUSCULOSKELETAL:  Denies neck or back pain or joint pain or swelling.


SKIN: Perirectal abscess


NEUROLOGICAL:  Denies altered mental status or loss of consciousness.  Denies 

headache.  Denies weakness or paralysis or loss of use of either side.  Denies 

problems with gait or speech.  Denies sensory or motor loss.


ALL OTHER SYSTEMS REVIEWED AND NEGATIVE.





Physical Exam





- Vital signs


Vitals: 


 











Temp Pulse Resp BP Pulse Ox


 


 99.4 F   126 H  18   152/106 H  96 


 


 02/12/18 19:51  02/12/18 19:51  02/12/18 19:51  02/12/18 19:51  02/12/18 19:51














- Notes


Notes: 





General Appearance: Well nourished, alert, cooperative, no acute distress, mild 

to moderate obvious discomfort.


Vitals: reviewed, See vital signs table.


Head: no swelling or tenderness to the head


Eyes: PERRL, EOMI, Conjuctiva clear


Lungs: No wheezing, No rales, No rhonci, No accessory muscle use, good air 

exchange bilaterally.


Heart: Normal rate, Regular rythm, No murmur, no rub


Abdomen: Normal BS, soft, No rigidity, No abdominal tenderness, No guarding, no 

rebound, 


Rectal: Patient has what appears to be a abscess start in the perirectal area 

and then continues in the largest into the perineum.  Perineum is very swollen 

and indurated and has an obvious large abscess in this area.  Abscess appears 

to taper down before reaching the genital area.


Extremities:  good pulses in all extremities, no swelling or tenderness in the 

extremities, no edema.


Skin: warm, dry, appropriate color, no rash


Neuro: speech clear, oriented x 3, normal affect, responds appropriately to 

questions.





Course





- Re-evaluation


Re-evalutation: 





02/13/18 01:51


CT scan is currently pending.  I have called and discussed the case with the 

surgeon, Dr. Vazquez, who agrees to admit the patient for definitive treatment 

such as surgical drainage of the abscess.  Patient has been started on 

Clindamycin and vancomycin being that she is a diabetic.  Patient will be 

monitored until transferred to her bed.





Dictation of this chart was performed using voice recognition software; 

therefore, there may be some unintended grammatical errors.





- Vital Signs


Vital signs: 


 











Temp Pulse Resp BP Pulse Ox


 


 99.4 F   126 H  18   152/106 H  96 


 


 02/12/18 19:51  02/12/18 19:51  02/12/18 19:51  02/12/18 19:51  02/12/18 19:51














- Laboratory


Result Diagrams: 


 02/12/18 22:40





 02/12/18 22:40


Laboratory results interpreted by me: 


 











  02/12/18 02/12/18 02/12/18





  22:37 22:40 22:40


 


WBC   17.6 H 


 


Absolute Neutrophils   12.7 H 


 


Sodium    135.9 L


 


BUN    5 L


 


Creatinine    0.44 L


 


Glucose    264 H


 


POC Glucose  266 H  


 


Lactic Acid   














  02/12/18





  22:40


 


WBC 


 


Absolute Neutrophils 


 


Sodium 


 


BUN 


 


Creatinine 


 


Glucose 


 


POC Glucose 


 


Lactic Acid  2.8 H














Discharge





- Discharge


Clinical Impression: 


 Perianal abscess, Perineal abscess





Condition: Stable


Disposition: ADMITTED AS INPATIENT


Admitting Provider: Surgicalist


Referrals: 


AHMET KNOX PA-C [Primary Care Provider] - Follow up as needed

## 2018-02-13 NOTE — PDOC H&P
History of Present Illness


Admission Date/PCP: 


  02/13/18 02:02





  AHMET KNOX PA-C





History of Present Illness: 


GREGORIO GONZALEZ is a 32 year old female with a painful left perianal 

swelling that started 3 days ago. There was minimal discharge from it today. 

She is diabetic.








Past Medical History


Endocrine Medical History: Reports: Diabetes Mellitus Type 2


GI Medical History: Reports: Gastroesophageal Reflux Disease


Musculoskeltal Medical History: Reports: Arthritis, Gout


Psychiatric Medical History: 


   Denies: Depression





Past Surgical History


Past Surgical History: Reports: Orthopedic Surgery - Back in 2013.  Fractured 

humerus repair fatty tumor from right leg





Social History


Smoking Status: Current Every Day Smoker


Cigarettes Packs Per Day: 1


Frequency of Alcohol Use: None


Hx Recreational Drug Use: No


Drugs: None


Hx Prescription Drug Abuse: No





- Advance Directive


Resuscitation Status: Full Code





Family History


Family History: Arthritis, DM, Hypertension, Malignancy.  denies: CAD, COPD, CVA

, Hyperlipidemia, Thyroid Disfunction


Parental Family History Reviewed: No


Children Family History Reviewed: Unknown


Sibling(s) Family History Reviewed.: Unknown





Medication/Allergy


Home Medications: 








Gabapentin [Neurontin] 600 mg PO HSP PRN 02/13/18 


Insulin Glargine,Hum.rec.anlog [Lantus] 50 units SUBCUT PC 02/13/18 


Metformin HCl 1,000 mg PO BID 02/13/18 








Allergies/Adverse Reactions: 


 





No Known Allergies Allergy (Verified 05/08/17 11:18)


 











Review of Systems


Constitutional: ABSENT: chills, fever(s), headache(s), weight gain, weight loss


Eyes: ABSENT: visual disturbances


Ears: ABSENT: hearing changes


Nose, Mouth, and Throat: ABSENT: as per HPI, headache(s), mouth pain, sore 

throat, vertigo, other


Cardiovascular: ABSENT: chest pain, dyspnea on exertion, edema, orthropnea, 

palpitations


Respiratory: ABSENT: cough, hemoptysis


Gastrointestinal: ABSENT: abdominal pain, constipation, diarrhea, hematemesis, 

hematochezia, nausea, vomiting


Genitourinary: ABSENT: dysuria, hematuria


Musculoskeletal: ABSENT: joint swelling


Integumentary: PRESENT: other - left perianal swelling with minimal drainage


Neurological: ABSENT: abnormal gait, abnormal speech, confusion, dizziness, 

focal weakness, syncope


Psychiatric: ABSENT: anxiety, depression, homidical ideation, suicidal ideation


Endocrine: ABSENT: cold intolerance, heat intolerance, polydipsia, polyuria


Hematologic/Lymphatic: ABSENT: easy bleeding, easy bruising





Physical Exam


Vital Signs: 


 











Temp Pulse Resp BP Pulse Ox


 


 98.3 F   93   20   114/66   96 


 


 02/13/18 18:30  02/13/18 18:30  02/13/18 18:30  02/13/18 18:30  02/13/18 18:30








 Intake & Output











 02/12/18 02/13/18 02/14/18





 06:59 06:59 06:59


 


Intake Total   800


 


Output Total   25


 


Balance   775











General appearance: PRESENT: no acute distress, well-developed, well-nourished


Head exam: PRESENT: atraumatic, normocephalic


Eye exam: PRESENT: conjunctiva pink, EOMI, PERRLA.  ABSENT: scleral icterus


Ear exam: PRESENT: normal external ear exam


Mouth exam: PRESENT: moist, tongue midline


Neck exam: ABSENT: carotid bruit, JVD, lymphadenopathy, thyromegaly


Respiratory exam: PRESENT: clear to auscultation michelle.  ABSENT: rales, rhonchi, 

wheezes


Cardiovascular exam: PRESENT: RRR.  ABSENT: diastolic murmur, rubs, systolic 

murmur


GI/Abdominal exam: PRESENT: normal bowel sounds, soft.  ABSENT: distended, 

guarding, mass, organolmegaly, rebound, tenderness


Rectal exam: PRESENT: other - erythematous swelling in the left perianal area 

about 6cm x 4.5cm x 3cm with a small punctum with pus


Musculoskeletal exam: PRESENT: ambulatory, deformity, dislocation, full ROM, 

normal inspection, tenderness, other


Neurological exam: PRESENT: alert, awake, oriented to person, oriented to place

, oriented to time, oriented to situation, CN II-XII grossly intact.  ABSENT: 

motor sensory deficit


Psychiatric exam: PRESENT: appropriate affect, normal mood.  ABSENT: homicidal 

ideation, suicidal ideation





Results


Laboratory Results: 


 











  02/13/18





  03:16


 


Lactic Acid  2.4 H











Impressions: 


 





Pelvis CT  02/13/18 00:31


IMPRESSION:


3.5 cm region of subcutaneous swelling/edema left paramedial


peroneal soft tissues may indicate cellulitis. No evidence of


abscess.


 














Assessment & Plan





- Diagnosis


(1) Type 2 diabetes mellitus


Is this a current diagnosis for this admission?: Yes   





(2) Perianal abscess


Is this a current diagnosis for this admission?: Yes   





- Plan Summary


Plan Summary: 





The patient is admitted 


NPO


For I&D of left perianal abscess

## 2018-02-13 NOTE — OPERATIVE REPORT
Operative Report


DATE OF SURGERY: 02/13/18


PREOPERATIVE DIAGNOSIS: Left perianal abscess


POSTOPERATIVE DIAGNOSIS: Left perianal abscess


OPERATION: Incision and drainage of Left perianal abscess


SURGEON: JUNG Vazquez


ANESTHESIA: Spinal


TISSUE REMOVED OR ALTERED: abscess left perianal area


COMPLICATIONS: 





none


ESTIMATED BLOOD LOSS: 20 ml


INTRAOPERATIVE FINDINGS: 6cm x 4.5cm x 3cm abscess cavity left perianal area 

with pus.


PROCEDURE: 





The patient was brought to the operating room and placed on the operating. 

Spinal anesthesia was administered and she was positioned in lithotomy with 

legs on Bryson stirrups. The perianal area, perineum and medial thighs were 

prepped with betadine and sterile drapes laid. A timeout was done. Under 

sterile aseptic conditions, a cruciate incision was made at the dome of the 

abscess and taken down to the cavity. pus was evacuated, cultures were taken. 

Loculi were broken down digitally. The cavity was irrigated with saline jennifer 

dressing of 1/4 inch iodoform gauze packing, 4x4 and tape applied. The patient 

tolerated the procedure well, she taken to the PACU in stable condition.

## 2018-02-13 NOTE — PDOC DISCHARGE SUMMARY
General





- Admit/Disc Date/PCP


Admission Date/Primary Care Provider: 


  02/13/18 02:02





  AHMET KNOX PA-C





Discharge Date: 02/14/18





- Discharge Diagnosis


(1) Type 2 diabetes mellitus


Is this a current diagnosis for this admission?: Yes   





(2) Perianal abscess


Is this a current diagnosis for this admission?: Yes   





- Additional Information


Resuscitation Status: Full Code


Discharge Activity: Activity As Tolerated


Prescriptions: 


Clindamycin HCl 300 mg PO Q6 #40 capsule


Docusate Sodium [Colace 100 mg Capsule] 100 mg PO BID #60 capsule


Oxycodone HCl/Acetaminophen [Percocet 5-325 mg Tablet] 1 tab PO Q4HP PRN #60 

tablet


 PRN Reason: 


Home Medications: 








Clindamycin HCl 300 mg PO Q6 #40 capsule 02/13/18 


Docusate Sodium [Colace 100 mg Capsule] 100 mg PO BID #60 capsule 02/13/18 


Gabapentin [Neurontin] 600 mg PO HSP PRN 02/13/18 


Insulin Glargine,Hum.rec.anlog [Lantus] 50 units SUBCUT PC 02/13/18 


Metformin HCl 1,000 mg PO BID 02/13/18 


Oxycodone HCl/Acetaminophen [Percocet 5-325 mg Tablet] 1 tab PO Q4HP PRN #60 

tablet 02/13/18 











History of Present Illness


History of Present Illness: 


GREGORIO GONZALEZ is a 32 year old female with a painful left perianal 

swelling that started 3 days ago. There was minimal discharge from it today. 

She is diabetic.








Hospital Course


Hospital Course: 





She had an I&D of the abscess an dis discharged home with wound dressings and 

po clindamycin





Physical Exam


Vital Signs: 


 











Temp Pulse Resp BP Pulse Ox


 


 98.3 F   93   20   114/66   96 


 


 02/13/18 18:30  02/13/18 18:30  02/13/18 18:30  02/13/18 18:30  02/13/18 18:30








 Intake & Output











 02/12/18 02/13/18 02/14/18





 06:59 06:59 06:59


 


Intake Total   800


 


Output Total   25


 


Balance   775











General appearance: PRESENT: no acute distress, well-developed, well-nourished


Head exam: PRESENT: atraumatic, normocephalic


Eye exam: PRESENT: conjunctiva pink, EOMI, PERRLA.  ABSENT: scleral icterus


Neck exam: ABSENT: carotid bruit, JVD, lymphadenopathy, thyromegaly


Respiratory exam: PRESENT: clear to auscultation michelle.  ABSENT: rales, rhonchi, 

wheezes


Cardiovascular exam: PRESENT: RRR.  ABSENT: diastolic murmur, rubs, systolic 

murmur


GI/Abdominal exam: PRESENT: normal bowel sounds, soft.  ABSENT: distended, 

guarding, mass, organolmegaly, rebound, tenderness


Rectal exam: PRESENT: other - left perianal abscess


Neurological exam: PRESENT: alert, awake, oriented to person, oriented to place

, oriented to time, oriented to situation, CN II-XII grossly intact.  ABSENT: 

motor sensory deficit





Results


Laboratory Results: 


 











  02/13/18





  03:16


 


Lactic Acid  2.4 H











Impressions: 


 





Pelvis CT  02/13/18 00:31


IMPRESSION:


3.5 cm region of subcutaneous swelling/edema left paramedial


peroneal soft tissues may indicate cellulitis. No evidence of


abscess.


 














Plan


Discharge Plan: 





sitz baths bid prn bowel movement


twice daily wound dressing changes


F/u in wound care clinic 


Home care nurse for wound care.

## 2018-02-13 NOTE — RADIOLOGY REPORT (SQ)
EXAM DESCRIPTION:

CT PELVIS WITH



CLINICAL HISTORY:

32 years Female, perineal and rectal abscess



COMPARISON:

None.



TECHNIQUE:

100 mL Isovue-370 IV contrast.  Coronal and sagittal reformat. 

This exam was performed according to our departmental

dose-optimization program, which includes automated exposure

control, adjustment of the mA and/or kV according to patient size

and/or use of iterative reconstruction technique.



FINDINGS:



3.5 cm patchy swelling and edema of subcutaneous fat of the left

paramedial perineum posteriorly; fluid component measures up to

1.1 x 1.9 cm. No evidence of abscess.



Normal appendix. No significant free fluid in the pelvis. Pelvic

structures appear otherwise intact.



IMPRESSION:

3.5 cm region of subcutaneous swelling/edema left paramedial

peroneal soft tissues may indicate cellulitis. No evidence of

abscess.

## 2018-02-14 VITALS — DIASTOLIC BLOOD PRESSURE: 89 MMHG | SYSTOLIC BLOOD PRESSURE: 139 MMHG

## 2018-02-14 LAB
ADD MANUAL DIFF: NO
BASOPHILS # BLD AUTO: 0.1 10^3/UL (ref 0–0.2)
BASOPHILS NFR BLD AUTO: 0.9 % (ref 0–2)
EOSINOPHIL # BLD AUTO: 0.1 10^3/UL (ref 0–0.6)
EOSINOPHIL NFR BLD AUTO: 0.9 % (ref 0–6)
ERYTHROCYTE [DISTWIDTH] IN BLOOD BY AUTOMATED COUNT: 12.9 % (ref 11.5–14)
HCT VFR BLD CALC: 40.6 % (ref 36–47)
HGB BLD-MCNC: 13.8 G/DL (ref 12–15.5)
LYMPHOCYTES # BLD AUTO: 3.4 10^3/UL (ref 0.5–4.7)
LYMPHOCYTES NFR BLD AUTO: 21.8 % (ref 13–45)
MCH RBC QN AUTO: 30.6 PG (ref 27–33.4)
MCHC RBC AUTO-ENTMCNC: 34.1 G/DL (ref 32–36)
MCV RBC AUTO: 90 FL (ref 80–97)
MONOCYTES # BLD AUTO: 0.9 10^3/UL (ref 0.1–1.4)
MONOCYTES NFR BLD AUTO: 5.6 % (ref 3–13)
NEUTROPHILS # BLD AUTO: 11.2 10^3/UL (ref 1.7–8.2)
NEUTS SEG NFR BLD AUTO: 70.8 % (ref 42–78)
PLATELET # BLD: 213 10^3/UL (ref 150–450)
RBC # BLD AUTO: 4.52 10^6/UL (ref 3.72–5.28)
TOTAL CELLS COUNTED % (AUTO): 100 %
WBC # BLD AUTO: 15.8 10^3/UL (ref 4–10.5)

## 2018-02-14 RX ADMIN — METRONIDAZOLE SCH ML: 500 INJECTION, SOLUTION INTRAVENOUS at 02:49

## 2018-02-14 RX ADMIN — INSULIN LISPRO PRN UNIT: 100 INJECTION, SOLUTION INTRAVENOUS; SUBCUTANEOUS at 07:04

## 2018-02-14 RX ADMIN — OXYCODONE AND ACETAMINOPHEN PRN TAB: 5; 325 TABLET ORAL at 02:48

## 2018-02-14 RX ADMIN — METRONIDAZOLE SCH ML: 500 INJECTION, SOLUTION INTRAVENOUS at 10:06

## 2018-02-14 RX ADMIN — METFORMIN HYDROCHLORIDE SCH MG: 500 TABLET, FILM COATED ORAL at 12:20

## 2018-02-14 RX ADMIN — Medication SCH ML: at 05:24

## 2018-02-14 RX ADMIN — Medication SCH ML: at 01:46

## 2018-02-14 RX ADMIN — OXYCODONE AND ACETAMINOPHEN PRN TAB: 5; 325 TABLET ORAL at 08:39

## 2018-02-14 NOTE — PDOC PROGRESS REPORT
Subjective


Progress Note for:: 02/14/18


Subjective:: 





Patient is seen on rounds. She is resting in bed. She denies any chest pain, 

shortness of breath or dyspnea. She denies any fevers or chills overnight. She 

is planning for discharge later this morning. She states she has refills on her 

diabetes medication and her insurance went into affect on Saturday. We stressed 

importance of keeping blood sugars under control so wound will heal. She will 

follow up with BRUNO Valdes in [place]


Reason For Visit: 


PERIANAL ABSCESS








Physical Exam


Vital Signs: 


 











Temp Pulse Resp BP Pulse Ox


 


 98.7 F   88   16   118/72   96 


 


 02/14/18 08:00  02/14/18 08:00  02/14/18 08:00  02/14/18 08:00  02/14/18 08:00








 Intake & Output











 02/13/18 02/14/18 02/15/18





 06:59 06:59 06:59


 


Intake Total  1190 1575


 


Output Total  25 


 


Balance  1165 1575


 


Weight  132.1 kg 











General appearance: PRESENT: no acute distress, morbidly obese, well-developed, 

well-nourished


Head exam: PRESENT: atraumatic, normocephalic


Eye exam: PRESENT: conjunctiva pink, EOMI, PERRLA.  ABSENT: scleral icterus


Ear exam: PRESENT: normal external ear exam


Mouth exam: PRESENT: moist, tongue midline


Neck exam: ABSENT: carotid bruit, JVD, lymphadenopathy, thyromegaly


Respiratory exam: PRESENT: accessory muscle use


Cardiovascular exam: PRESENT: RRR.  ABSENT: diastolic murmur, rubs, systolic 

murmur


Pulses: PRESENT: normal dorsalis pedis pul


Vascular exam: PRESENT: normal capillary refill


GI/Abdominal exam: PRESENT: normal bowel sounds, soft.  ABSENT: distended, 

guarding, mass, organolmegaly, rebound, tenderness


Rectal exam: PRESENT: deferred


Extremities exam: PRESENT: full ROM.  ABSENT: calf tenderness, clubbing, pedal 

edema


Neurological exam: PRESENT: alert, awake, oriented to person, oriented to place

, oriented to time, oriented to situation, CN II-XII grossly intact.  ABSENT: 

motor sensory deficit


Psychiatric exam: PRESENT: appropriate affect, normal mood.  ABSENT: homicidal 

ideation, suicidal ideation


Skin exam: PRESENT: dry, erythema - Mild erythema along gluteal fold. Surgical 

dressing intact, warm





Results


Laboratory Results: 


 





 02/14/18 06:47 





 











  02/14/18





  06:47


 


WBC  15.8 H


 


RBC  4.52


 


Hgb  13.8


 


Hct  40.6


 


MCV  90


 


MCH  30.6


 


MCHC  34.1


 


RDW  12.9


 


Plt Count  213


 


Seg Neutrophils %  70.8


 


Lymphocytes %  21.8


 


Monocytes %  5.6


 


Eosinophils %  0.9


 


Basophils %  0.9


 


Absolute Neutrophils  11.2 H


 


Absolute Lymphocytes  3.4


 


Absolute Monocytes  0.9


 


Absolute Eosinophils  0.1


 


Absolute Basophils  0.1











Impressions: 


 





Pelvis CT  02/13/18 00:31


IMPRESSION:


3.5 cm region of subcutaneous swelling/edema left paramedial


peroneal soft tissues may indicate cellulitis. No evidence of


abscess.


 














Assessment & Plan





- Diagnosis


(1) Perianal abscess


Is this a current diagnosis for this admission?: Yes   


Plan: 


She will be discharged on Clindamycin, prn pain medication and follow up with 

surgery clinic.








(2) Type 2 diabetes mellitus


Qualifiers: 


   Diabetes mellitus complication detail: with other skin complication 


Is this a current diagnosis for this admission?: Yes   


Plan: 


Patient will restart metformin. We discussed need to keep her sugar under 

control to help wound heal








(3) Morbid obesity


Is this a current diagnosis for this admission?: Yes   


Plan: 


Counseled








- Time


Time Spent with patient: 15-24 minutes


Medications reviewed and adjusted accordingly: Yes


Anticipated discharge: Home

## 2018-03-10 ENCOUNTER — HOSPITAL ENCOUNTER (OUTPATIENT)
Dept: HOSPITAL 62 - RAD | Age: 33
End: 2018-03-10
Attending: FAMILY MEDICINE
Payer: COMMERCIAL
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
## 2018-03-10 DIAGNOSIS — S83.511A: Primary | ICD-10-CM

## 2018-03-10 NOTE — RADIOLOGY REPORT (SQ)
EXAM DESCRIPTION:  MRI RT LOWER JOINT WITHOUT



COMPLETED DATE/TIME:  3/10/2018 12:53 pm



REASON FOR STUDY:  SPRAIN OF ANTERIOR CRUCIATE LIGAMENT OR RIGHT KNEE S83.511A  SPRAIN OF ANTERIOR CR
UCIATE LIGAMENT OF RIGHT KNEE,



COMPARISON:  Recent radiographs from January.



TECHNIQUE:  Rightknee images acquired and stored on PACS.  Multiplanar images include fat sensitive s
equences as T1, water sensitive sequences as FST2 or STIR, cartilage sensitive sequences as FSPD, and
 gradient echo sequences.



LIMITATIONS:  None.



FINDINGS:  JOINT AND BURSAE: Small-moderate effusion.  No loose bodies.

BONE CORTEX AND MARROW: No alteration of signal to suggest marrow replacement. No worrisome bone lesi
ons. No occult fracture.

ACL: Complete disruption.  Ill-defined anteriorly displaced tissue probably represents ACL stump.

PCL: Intact.  Probable mild degenerative signal.

MCL: Intact.

LCL: Intact.

MEDIAL MENISCUS: No tears. No abnormal signal.

LATERAL MENISCUS: No tears. No abnormal signal.

MEDIAL COMPARTMENT: Cartilage preserved. No bone bruises or reactive marrow edema. No osteophytes.

LATERAL COMPARTMENT: Cartilage preserved. No bone bruises or reactive marrow edema. No osteophytes.

PATELLA: No chondromalacia. No subchondral cysts. Medial and lateral retinacula intact.

EXTENSOR MECHANISM: Intact. Quadriceps and patella tendons normal.

SOFT TISSUES: Adjacent muscles and subcutaneous tissues normal.  Normal flow void in popliteal artery
 and vein.

OTHER: No other significant finding.



IMPRESSION:  1. Complete ACL tear.  2.  Collateral ligaments intact.  No meniscus tear.  No chondral 
lesions.



TECHNICAL DOCUMENTATION:  JOB ID:  [id number]

 2011 [employer]- All Rights Reserved



Reading location - IP/workstation name: [email or web address]

## 2018-05-10 ENCOUNTER — HOSPITAL ENCOUNTER (EMERGENCY)
Dept: HOSPITAL 62 - ER | Age: 33
Discharge: HOME | End: 2018-05-10
Payer: COMMERCIAL

## 2018-05-10 VITALS — DIASTOLIC BLOOD PRESSURE: 83 MMHG | SYSTOLIC BLOOD PRESSURE: 124 MMHG

## 2018-05-10 DIAGNOSIS — N39.0: ICD-10-CM

## 2018-05-10 DIAGNOSIS — E66.01: ICD-10-CM

## 2018-05-10 DIAGNOSIS — I10: ICD-10-CM

## 2018-05-10 DIAGNOSIS — G44.209: ICD-10-CM

## 2018-05-10 DIAGNOSIS — E11.9: ICD-10-CM

## 2018-05-10 DIAGNOSIS — K80.80: Primary | ICD-10-CM

## 2018-05-10 DIAGNOSIS — R10.11: ICD-10-CM

## 2018-05-10 LAB
A TYPE INFLUENZA AG: NEGATIVE
ADD MANUAL DIFF: NO
ALBUMIN SERPL-MCNC: 3.9 G/DL (ref 3.5–5)
ALP SERPL-CCNC: 47 U/L (ref 38–126)
ALT SERPL-CCNC: 39 U/L (ref 9–52)
ANION GAP SERPL CALC-SCNC: 16 MMOL/L (ref 5–19)
APPEARANCE UR: (no result)
APTT PPP: (no result) S
AST SERPL-CCNC: 26 U/L (ref 14–36)
B INFLUENZA AG: NEGATIVE
BASOPHILS # BLD AUTO: 0.1 10^3/UL (ref 0–0.2)
BASOPHILS NFR BLD AUTO: 0.7 % (ref 0–2)
BILIRUB DIRECT SERPL-MCNC: 0.5 MG/DL (ref 0–0.4)
BILIRUB SERPL-MCNC: 0.8 MG/DL (ref 0.2–1.3)
BILIRUB UR QL STRIP: NEGATIVE
BUN SERPL-MCNC: 6 MG/DL (ref 7–20)
CALCIUM: 9 MG/DL (ref 8.4–10.2)
CHLORIDE SERPL-SCNC: 100 MMOL/L (ref 98–107)
CK MB SERPL-MCNC: < 0.22 NG/ML (ref ?–4.55)
CK SERPL-CCNC: 56 U/L (ref 30–135)
CO2 SERPL-SCNC: 24 MMOL/L (ref 22–30)
EOSINOPHIL # BLD AUTO: 0.1 10^3/UL (ref 0–0.6)
EOSINOPHIL NFR BLD AUTO: 1.7 % (ref 0–6)
ERYTHROCYTE [DISTWIDTH] IN BLOOD BY AUTOMATED COUNT: 13.3 % (ref 11.5–14)
GLUCOSE SERPL-MCNC: 193 MG/DL (ref 75–110)
GLUCOSE UR STRIP-MCNC: 50 MG/DL
HCT VFR BLD CALC: 38 % (ref 36–47)
HGB BLD-MCNC: 13.5 G/DL (ref 12–15.5)
KETONES UR STRIP-MCNC: NEGATIVE MG/DL
LYMPHOCYTES # BLD AUTO: 2.8 10^3/UL (ref 0.5–4.7)
LYMPHOCYTES NFR BLD AUTO: 31.7 % (ref 13–45)
MCH RBC QN AUTO: 30.9 PG (ref 27–33.4)
MCHC RBC AUTO-ENTMCNC: 35.5 G/DL (ref 32–36)
MCV RBC AUTO: 87 FL (ref 80–97)
MONOCYTES # BLD AUTO: 0.7 10^3/UL (ref 0.1–1.4)
MONOCYTES NFR BLD AUTO: 7.5 % (ref 3–13)
NEUTROPHILS # BLD AUTO: 5.1 10^3/UL (ref 1.7–8.2)
NEUTS SEG NFR BLD AUTO: 58.4 % (ref 42–78)
NITRITE UR QL STRIP: NEGATIVE
PH UR STRIP: 5 [PH] (ref 5–9)
PLATELET # BLD: 182 10^3/UL (ref 150–450)
POTASSIUM SERPL-SCNC: 3.6 MMOL/L (ref 3.6–5)
PROT SERPL-MCNC: 7 G/DL (ref 6.3–8.2)
PROT UR STRIP-MCNC: 100 MG/DL
RBC # BLD AUTO: 4.36 10^6/UL (ref 3.72–5.28)
SODIUM SERPL-SCNC: 139.6 MMOL/L (ref 137–145)
SP GR UR STRIP: 1.02
TOTAL CELLS COUNTED % (AUTO): 100 %
TROPONIN I SERPL-MCNC: < 0.012 NG/ML
UROBILINOGEN UR-MCNC: 4 MG/DL (ref ?–2)
WBC # BLD AUTO: 8.8 10^3/UL (ref 4–10.5)

## 2018-05-10 PROCEDURE — 36415 COLL VENOUS BLD VENIPUNCTURE: CPT

## 2018-05-10 PROCEDURE — 93005 ELECTROCARDIOGRAM TRACING: CPT

## 2018-05-10 PROCEDURE — 81001 URINALYSIS AUTO W/SCOPE: CPT

## 2018-05-10 PROCEDURE — 76705 ECHO EXAM OF ABDOMEN: CPT

## 2018-05-10 PROCEDURE — 82553 CREATINE MB FRACTION: CPT

## 2018-05-10 PROCEDURE — 82550 ASSAY OF CK (CPK): CPT

## 2018-05-10 PROCEDURE — 87088 URINE BACTERIA CULTURE: CPT

## 2018-05-10 PROCEDURE — 87804 INFLUENZA ASSAY W/OPTIC: CPT

## 2018-05-10 PROCEDURE — 87086 URINE CULTURE/COLONY COUNT: CPT

## 2018-05-10 PROCEDURE — 84484 ASSAY OF TROPONIN QUANT: CPT

## 2018-05-10 PROCEDURE — 71045 X-RAY EXAM CHEST 1 VIEW: CPT

## 2018-05-10 PROCEDURE — 85025 COMPLETE CBC W/AUTO DIFF WBC: CPT

## 2018-05-10 PROCEDURE — 80053 COMPREHEN METABOLIC PANEL: CPT

## 2018-05-10 PROCEDURE — 84703 CHORIONIC GONADOTROPIN ASSAY: CPT

## 2018-05-10 PROCEDURE — 87186 SC STD MICRODIL/AGAR DIL: CPT

## 2018-05-10 PROCEDURE — 93010 ELECTROCARDIOGRAM REPORT: CPT

## 2018-05-10 PROCEDURE — 99284 EMERGENCY DEPT VISIT MOD MDM: CPT

## 2018-05-10 NOTE — RADIOLOGY REPORT (SQ)
EXAM DESCRIPTION:  CHEST SINGLE VIEW



COMPLETED DATE/TIME:  5/10/2018 3:39 pm



REASON FOR STUDY:  chest pain



COMPARISON:  4/21/2018



EXAM PARAMETERS:  NUMBER OF VIEWS: One view.

TECHNIQUE: Single frontal radiographic view of the chest acquired.

RADIATION DOSE: NA

LIMITATIONS: None.



FINDINGS:  LUNGS AND PLEURA: No opacities, masses or pneumothorax. No pleural effusion.

MEDIASTINUM AND HILAR STRUCTURES: No masses.  Contour normal.

HEART AND VASCULAR STRUCTURES: Heart normal in size.  Normal vasculature.

BONES: No acute findings.

HARDWARE: None in the chest.

OTHER: No other significant finding.



IMPRESSION:  NO ACUTE RADIOGRAPHIC FINDING IN THE CHEST.



TECHNICAL DOCUMENTATION:  JOB ID:  [id number]

 2011 [employer]- All Rights Reserved



Reading location - IP/workstation name: [email or web address]

## 2018-05-10 NOTE — ER DOCUMENT REPORT
ED General





- General


Chief Complaint: Flu Symptoms


Stated Complaint: COLD SYMPTOMS


Time Seen by Provider: 05/10/18 14:22


Mode of Arrival: Ambulatory


TRAVEL OUTSIDE OF THE [place] IN LAST 30 DAYS: No





- HPI


Patient complains to provider of: Multiple complaints


Notes: 





Patient coming in for headaches ongoing for the last few days fevers and chills 

no nausea no vomiting patient states followed up with her PCP nurse 

practitioner Ahmet veloz found to have some right upper quadrant tenderness 

therefore sent to the ER for further evaluation.  Patient resting comfortably 

upon my evaluation nontoxic looking.  Patient states night prior to today she 

had pizza no exacerbation of her abdominal pain patient states that is tender 

to touch denies nausea vomiting diarrhea.  Patient states positive dysuria 

patient states had a fever of 101 few days ago patient is currently afebrile.  

Patient is diabetic patient is obese.  Again patient resting comfortably upon 

my evaluation denies chest pain.  Headache is in the occipital region hurting 

more whenever she moves her head





- Related Data


Allergies/Adverse Reactions: 


 





No Known Allergies Allergy (Verified 05/08/17 11:18)


 











Past Medical History





- General


Information source: Patient





- Social History


Smoking Status: Never Smoker


Chew tobacco use (# tins/day): No


Frequency of alcohol use: None


Drug Abuse: None


Family History: Arthritis, DM, Hypertension, Malignancy.  denies: CAD, COPD, CVA

, Hyperlipidemia, Thyroid Disfunction


Patient has suicidal ideation: No


Patient has homicidal ideation: No





- Past Medical History


Cardiac Medical History: Reports: Hx Hypertension


Pulmonary Medical History: 


Neurological Medical History: 


Endocrine Medical History: Reports: Hx Diabetes Mellitus Type 2


Renal/ Medical History: Denies: Hx Peritoneal Dialysis


GI Medical History: Reports: Hx Gastroesophageal Reflux Disease


Musculoskeltal Medical History: Reports Hx Arthritis, Reports Hx Gout, Reports 

Hx Musculoskeletal Deformity, Reports Hx Musculoskeletal Trauma


Psychiatric Medical History: 


   Denies: Hx Depression


Traumatic Medical History: Reports: Hx Fractures - Humerus


Past Surgical History: Reports: Hx Oral Surgery - [place] teeth, Hx Orthopedic 

Surgery - Back in 2013.  Fractured humerus repair fatty tumor from right leg





- Immunizations


Immunizations up to date: Yes


Hx Diphtheria, Pertussis, Tetanus Vaccination: Yes





Review of Systems





- Review of Systems


Constitutional: Other - Abdominal pain headache fevers chills


EENT: No symptoms reported


Cardiovascular: No symptoms reported


Respiratory: No symptoms reported


Gastrointestinal: No symptoms reported


Genitourinary: No symptoms reported


Female Genitourinary: No symptoms reported


Musculoskeletal: No symptoms reported


Skin: No symptoms reported


Hematologic/Lymphatic: No symptoms reported


Neurological/Psychological: No symptoms reported





Physical Exam





- Vital signs


Vitals: 


 











Temp Pulse Resp BP Pulse Ox


 


 99.2 F   88   20   134/90 H  97 


 


 05/10/18 13:42  05/10/18 13:42  05/10/18 13:42  05/10/18 13:42  05/10/18 13:42











Interpretation: Normal





- General


General appearance: Appears well, Alert





- HEENT


Head: Normocephalic, Atraumatic


Eyes: Normal


Conjunctiva: Normal


Cornea: Normal


Pupils: PERRL





- Respiratory


Respiratory status: No respiratory distress


Chest status: Nontender


Breath sounds: Normal


Chest palpation: Normal





- Cardiovascular


Rhythm: Regular


Heart sounds: Normal auscultation


Murmur: No





- Abdominal


Inspection: Normal


Distension: No distension


Bowel sounds: Normal


Tenderness: Tender - Mild tenderness to the right upper quadrant no Deluca 

sign.  No: McBurney's point, Deluca's sign, Guarding, Rebound


Organomegaly: No organomegaly





- Back


Back: Normal, Nontender





- Extremities


General upper extremity: Normal inspection, Nontender, Normal color, Normal ROM

, Normal temperature


General lower extremity: Normal inspection, Nontender, Normal color, Normal ROM

, Normal temperature, Normal weight bearing.  No: Kanchan's sign





- Neurological


Neuro grossly intact: Yes


Cognition: Normal


Orientation: AAOx4


[place] Coma Scale Eye Opening: Spontaneous


[place] Coma Scale Verbal: Oriented


Shania Coma Scale Motor: Obeys Commands


Shania Coma Scale Total: 15


Speech: Normal


Motor strength normal: LUE, RUE, LLE, RLE


Sensory: Normal





- Psychological


Associated symptoms: Normal affect, Normal mood





- Skin


Skin Temperature: Warm


Skin Moisture: Dry


Skin Color: Normal





Course





- Re-evaluation


Re-evalutation: 





05/10/18 22:35


Urinalysis consistent with a UTI laboratory studies show no leukocytosis Chem-

12 shows no elevation in bili or LFTs suggestive of a obstructive process.  

Ultrasound does show gallstones.  Patient remained stable here in the ER.  

Patient headache upon description is more consistent with a tension headache.  

Did discuss with the patient about her results recommend patient follow-up with 

outpatient surgery recommend patient use lidocaine patches Tylenol and Motrin 

for her pain control and follow-up with her PCP.  Patient states understanding 

was discharged home.  The patient presents with abdominal pain without signs of 

peritonitis or other life-threatening or serious etiology. The patient appears 

stable for discharge and has been instructed to return immediately if the 

symptoms worsen in any way, or in 8-12hr if not improved for re-evaluation. The 

patient has been instructed to return if the symptoms worsen or change in any 

way.


05/10/18 22:36








- Vital Signs


Vital signs: 


 











Temp Pulse Resp BP Pulse Ox


 


 99.3 F   81   16   124/83   96 


 


 05/10/18 17:39  05/10/18 17:39  05/10/18 17:39  05/10/18 17:39  05/10/18 17:39














- Laboratory


Result Diagrams: 


 05/10/18 15:10





 05/10/18 15:10


Laboratory results interpreted by me: 


 











  05/10/18 05/10/18





  14:36 15:10


 


BUN   6 L


 


Creatinine   0.40 L


 


Glucose   193 H


 


Direct Bilirubin   0.5 H


 


Urine Protein  100 H 


 


Urine Glucose (UA)  50 H 


 


Urine Blood  SMALL H 


 


Urine Urobilinogen  4.0 H 


 


Ur Leukocyte Esterase  LARGE H 














Discharge





- Discharge


Clinical Impression: 


 Gallstones, Tension headache, Morbid obesity





UTI (urinary tract infection)


Qualifiers:


 Urinary tract infection type: site unspecified Hematuria presence: without 

hematuria Qualified Code(s): N39.0 - Urinary tract infection, site not specified





Condition: Good


Disposition: HOME, SELF-CARE


Instructions:  Nitrofurantoin ([facility]), Surgeon, Urinary Tract Infection ([facility])


Additional Instructions: 


Your ultrasound today shows her gallbladder has gallstones within it.  Whenever 

he eats anything it has a high amount of fat grease or oil your gallbladder was 

agrees that she secrete my office to help digest this and sometimes the stones 

to get in the way and cause pain.  At this time do not see any signs of 

inflammation of the gallbladder or any signs to indicate that she will need 

emergent gallbladder removal.  Recommend eating a low-fat diet following up 

with your primary care and the surgical clinic provided.  Continue home 

medications.  Your headache looks to be a tension headache would recommend 

Tylenol Motrin for that he can also try over-the-counter lidocaine patches such 

as the patch that we gave you here in the ER if you do receive relief.  Your 

urine does show signs of urinary tract infection recommend continuing with the 

Macrobid as prescribed we discontinue urine for culture and will have analysis 

in approximately 72 hours.  If we do not call you there is no need to change 

her antibiotic and continue the Macrobid as prescribed.  Recommend Tylenol 

Motrin for your pain make sure that she follow-up as stated return to the ER 

for any worsening symptoms


Prescriptions: 


Ibuprofen [Motrin 800 mg Tablet] 800 mg PO Q8H PRN #30 tab


 PRN Reason: 


Nitrofurantoin Monohyd/M-Cryst [Macrobid 100 mg Capsule] 100 mg PO BID #20 

capsule


Referrals: 


AHMET VELOZ PA-C [Primary Care Provider] - Follow up as needed

## 2018-05-10 NOTE — RADIOLOGY REPORT (SQ)
EXAM DESCRIPTION:  U/S ABDOMEN LIMITED W/O DOP



COMPLETED DATE/TIME:  5/10/2018 4:53 pm



REASON FOR STUDY:  ruq abd tender



COMPARISON:  None.



TECHNIQUE:  Dynamic and static grayscale images acquired of the abdomen and recorded on PACS. Additio
blake selected color Doppler and spectral images recorded.



LIMITATIONS:  None.



FINDINGS:  PANCREAS: Not seen.

LIVER: Poorly seen.  22.7 cm.  Increased echogenicity.

LIVER VASCULATURE: Normal directional flow of the main portal vein and hepatic veins.

GALLBLADDER: Multiple gallstones are suggested.

ULTRASOUND-DETECTED AWAD'S SIGN: Negative.

INTRAHEPATIC DUCTS AND COMMON DUCT: Common bile duct is borderline at 6 mm.

INFERIOR VENA CAVA: Not seen.

AORTA: No aneurysm.

RIGHT KIDNEY:  Normal size, 12.9 cm. Normal echogenicity. No solid or suspicious masses. No hydroneph
rosis. No calcifications.

PERITONEAL AND RIGHT PLEURAL SPACE: No ascites or effusions.

OTHER: No other significant findings.



IMPRESSION:  1.  Fatty infiltration of the liver.  Hepatomegaly.

2.  Cholelithiasis with borderline common bile duct.



TECHNICAL DOCUMENTATION:  JOB ID:  [id number]

 [employer]- All Rights Reserved



Reading location - IP/workstation name: [email or web address]

## 2018-05-10 NOTE — ER DOCUMENT REPORT
ED Medical Screen (RME)





- General


Chief Complaint: Flu Symptoms


Stated Complaint: COLD SYMPTOMS


Time Seen by Provider: 05/10/18 14:22


Mode of Arrival: Ambulatory


Information source: Patient


Notes: 





32-year-old female complaining of all of her headache posterior occipital neck 

pain and fever Tuesday and Wednesday.  Today she started having chest tightness 

with inspiration that is midline retrosternal to the left side that radiates 

through to the back.  She is a type I diabetic and obese.  She saw Ahmet veloz 

her primary care doctor who found right upper quadrant tenderness and wanted 

her to be sent to the emergency room to be worked up for gallbladder.  I 

discussed this case with Bernardo and ordered according to his recommendations.


TRAVEL OUTSIDE OF THE [place] IN LAST 30 DAYS: No





- Related Data


Allergies/Adverse Reactions: 


 





No Known Allergies Allergy (Verified 05/08/17 11:18)


 











Past Medical History





- Social History


Chew tobacco use (# tins/day): No


Frequency of alcohol use: None


Drug Abuse: None





- Past Medical History


Cardiac Medical History: Reports: Hx Hypertension


Pulmonary Medical History: 


Neurological Medical History: 


Endocrine Medical History: Reports: Hx Diabetes Mellitus Type 2


Renal/ Medical History: Denies: Hx Peritoneal Dialysis


GI Medical History: Reports: Hx Gastroesophageal Reflux Disease


Musculoskeltal Medical History: Reports Hx Arthritis, Reports Hx Gout, Reports 

Hx Musculoskeletal Deformity, Reports Hx Musculoskeletal Trauma


Psychiatric Medical History: 


   Denies: Hx Depression


Traumatic Medical History: Reports: Hx Fractures - Humerus


Past Surgical History: Reports: Hx Oral Surgery - [place] teeth, Hx Orthopedic 

Surgery - Back in 2013.  Fractured humerus repair fatty tumor from right leg





- Immunizations


Immunizations up to date: Yes


Hx Diphtheria, Pertussis, Tetanus Vaccination: Yes


History of Influenza Vaccine for 10/2017 - 3/2018 Season: Refused





Physical Exam





- Vital signs


Vitals: 


 











Temp Pulse Resp BP Pulse Ox


 


 99.2 F   88   20   134/90 H  97 


 


 05/10/18 13:42  05/10/18 13:42  05/10/18 13:42  05/10/18 13:42  05/10/18 13:42














Course





- Vital Signs


Vital signs: 


 











Temp Pulse Resp BP Pulse Ox


 


 99.2 F   88   20   134/90 H  97 


 


 05/10/18 13:42  05/10/18 13:42  05/10/18 13:42  05/10/18 13:42  05/10/18 13:42














Doctor's Discharge





- Discharge


Referrals: 


AHMET VELOZ PA-C [Primary Care Provider] - Follow up as needed

## 2019-10-11 ENCOUNTER — HOSPITAL ENCOUNTER (EMERGENCY)
Dept: HOSPITAL 62 - ER | Age: 34
Discharge: HOME | End: 2019-10-11
Payer: COMMERCIAL

## 2019-10-11 VITALS — DIASTOLIC BLOOD PRESSURE: 98 MMHG | SYSTOLIC BLOOD PRESSURE: 145 MMHG

## 2019-10-11 DIAGNOSIS — F17.200: ICD-10-CM

## 2019-10-11 DIAGNOSIS — E11.9: ICD-10-CM

## 2019-10-11 DIAGNOSIS — N83.201: Primary | ICD-10-CM

## 2019-10-11 DIAGNOSIS — R10.2: ICD-10-CM

## 2019-10-11 DIAGNOSIS — I10: ICD-10-CM

## 2019-10-11 LAB
ADD MANUAL DIFF: NO
ALBUMIN SERPL-MCNC: 4.2 G/DL (ref 3.5–5)
ALP SERPL-CCNC: 50 U/L (ref 38–126)
ANION GAP SERPL CALC-SCNC: 12 MMOL/L (ref 5–19)
APPEARANCE UR: (no result)
APTT PPP: YELLOW S
AST SERPL-CCNC: 29 U/L (ref 14–36)
BASOPHILS # BLD AUTO: 0.1 10^3/UL (ref 0–0.2)
BASOPHILS NFR BLD AUTO: 0.6 % (ref 0–2)
BILIRUB DIRECT SERPL-MCNC: 0.2 MG/DL (ref 0–0.4)
BILIRUB SERPL-MCNC: 0.6 MG/DL (ref 0.2–1.3)
BILIRUB UR QL STRIP: NEGATIVE
BUN SERPL-MCNC: 9 MG/DL (ref 7–20)
CALCIUM: 9.7 MG/DL (ref 8.4–10.2)
CHLORIDE SERPL-SCNC: 99 MMOL/L (ref 98–107)
CO2 SERPL-SCNC: 25 MMOL/L (ref 22–30)
EOSINOPHIL # BLD AUTO: 0.3 10^3/UL (ref 0–0.6)
EOSINOPHIL NFR BLD AUTO: 2.2 % (ref 0–6)
ERYTHROCYTE [DISTWIDTH] IN BLOOD BY AUTOMATED COUNT: 12.6 % (ref 11.5–14)
GLUCOSE SERPL-MCNC: 309 MG/DL (ref 75–110)
GLUCOSE UR STRIP-MCNC: >=500 MG/DL
HCT VFR BLD CALC: 41.9 % (ref 36–47)
HGB BLD-MCNC: 14.5 G/DL (ref 12–15.5)
KETONES UR STRIP-MCNC: (no result) MG/DL
LYMPHOCYTES # BLD AUTO: 4.3 10^3/UL (ref 0.5–4.7)
LYMPHOCYTES NFR BLD AUTO: 34.6 % (ref 13–45)
MCH RBC QN AUTO: 30.7 PG (ref 27–33.4)
MCHC RBC AUTO-ENTMCNC: 34.7 G/DL (ref 32–36)
MCV RBC AUTO: 89 FL (ref 80–97)
MONOCYTES # BLD AUTO: 0.6 10^3/UL (ref 0.1–1.4)
MONOCYTES NFR BLD AUTO: 4.8 % (ref 3–13)
NEUTROPHILS # BLD AUTO: 7.2 10^3/UL (ref 1.7–8.2)
NEUTS SEG NFR BLD AUTO: 57.8 % (ref 42–78)
NITRITE UR QL STRIP: NEGATIVE
PH UR STRIP: 5 [PH] (ref 5–9)
PLATELET # BLD: 197 10^3/UL (ref 150–450)
POTASSIUM SERPL-SCNC: 3.8 MMOL/L (ref 3.6–5)
PROT SERPL-MCNC: 7.2 G/DL (ref 6.3–8.2)
PROT UR STRIP-MCNC: 100 MG/DL
RBC # BLD AUTO: 4.73 10^6/UL (ref 3.72–5.28)
SP GR UR STRIP: 1.03
TOTAL CELLS COUNTED % (AUTO): 100 %
UROBILINOGEN UR-MCNC: 2 MG/DL (ref ?–2)
WBC # BLD AUTO: 12.4 10^3/UL (ref 4–10.5)

## 2019-10-11 PROCEDURE — 93976 VASCULAR STUDY: CPT

## 2019-10-11 PROCEDURE — 99284 EMERGENCY DEPT VISIT MOD MDM: CPT

## 2019-10-11 PROCEDURE — 96360 HYDRATION IV INFUSION INIT: CPT

## 2019-10-11 PROCEDURE — 36415 COLL VENOUS BLD VENIPUNCTURE: CPT

## 2019-10-11 PROCEDURE — 81001 URINALYSIS AUTO W/SCOPE: CPT

## 2019-10-11 PROCEDURE — 81025 URINE PREGNANCY TEST: CPT

## 2019-10-11 PROCEDURE — 80053 COMPREHEN METABOLIC PANEL: CPT

## 2019-10-11 PROCEDURE — 85025 COMPLETE CBC W/AUTO DIFF WBC: CPT

## 2019-10-11 PROCEDURE — 76830 TRANSVAGINAL US NON-OB: CPT

## 2019-10-11 NOTE — ER DOCUMENT REPORT
ED Medical Screen (RME)





- General


Chief Complaint: Abdominal Pain


Stated Complaint: PELVIC PAIN


Time Seen by Provider: 10/11/19 18:49


Primary Care Provider: 


AHMET KNOX PA-C [Primary Care Provider] - Follow up as needed


TRAVEL OUTSIDE OF THE [place] IN LAST 30 DAYS: No





- HPI


Notes: 





10/11/19 18:55


Patient is a 34-year-old female with history of B-cell lymphoma ('watch and 

wait') and rt ovarian cysts who presents complaining of right lower pelvic pain 

over the past 3 days has been relatively constant.  Denies pregnancy.  No 

vaginal bleeding or significant discharge.  No fever.





I have treated and performed a rapid initial assessment of this patient.  A 

comprehensive ED assessment and evaluation of the patient, analysis of test 

results and completion of medical decision making process will be conducted by 

additional ED providers.





PHYSICAL EXAMINATION:





GENERAL: Well-appearing, well-nourished and in no acute distress.  A&Ox4.  

Answers questions appropriately.


Abd:  + mild tenderness rt lower pelvic area





- Related Data


Allergies/Adverse Reactions: 


                                        





No Known Allergies Allergy (Verified 05/08/17 11:18)


   











Past Medical History





- Past Medical History


Cardiac Medical History: Reports: Hx Hypertension


Pulmonary Medical History: 


Neurological Medical History: 


Endocrine Medical History: Reports: Hx Diabetes Mellitus Type 2


Renal/ Medical History: Denies: Hx Peritoneal Dialysis


GI Medical History: Reports: Hx Gastroesophageal Reflux Disease


Musculoskeltal Medical History: Reports Hx Arthritis, Reports Hx Gout, Reports 

Hx Musculoskeletal Deformity, Reports Hx Musculoskeletal Trauma


Psychiatric Medical History: 


   Denies: Hx Depression


Traumatic Medical History: Reports: Hx Fractures - Humerus


Past Surgical History: Reports: Hx Oral Surgery - [place] teeth, Hx Orthopedic 

Surgery - Back in 2013.  Fractured humerus repair fatty tumor from right leg





- Immunizations


Immunizations up to date: Yes


Hx Diphtheria, Pertussis, Tetanus Vaccination: Yes





Physical Exam





- Vital signs


Vitals: 





                                        











Temp Pulse Resp BP Pulse Ox


 


 98.7 F   88   18   176/98 H  98 


 


 10/11/19 18:36  10/11/19 18:36  10/11/19 18:36  10/11/19 18:36  10/11/19 18:36














Course





- Vital Signs


Vital signs: 





                                        











Temp Pulse Resp BP Pulse Ox


 


 98.7 F   88   18   176/98 H  98 


 


 10/11/19 18:36  10/11/19 18:36  10/11/19 18:36  10/11/19 18:36  10/11/19 18:36














Doctor's Discharge





- Discharge


Referrals: 


AHMET KNOX PA-C [Primary Care Provider] - Follow up as needed

## 2019-10-11 NOTE — RADIOLOGY REPORT (SQ)
EXAM DESCRIPTION: 



US PELVIS TRANSVAGINAL



COMPLETED DATE/TME:  10/11/2019 18:55



CLINICAL HISTORY:  34 years  Female  rt pelvic pain



COMPARISON:  None.



TECHNIQUE: Transvaginal duplex imaging performed to evaluate the

pelvis.



FINDINGS:



Cervix measures 1.9 cm and appears closed.

The uterus measures 8.6 x 3.4 cm.

Right ovary is normal in size measuring 4 x 2.8 cm. Simple

appearing cyst measures 1.8 x 1.7 cm. This is almost certainly

benign and no follow-up is recommended.

Endometrium measures 11 mm.

Left ovary is not seen.

Normal blood flow to the right ovary.



IMPRESSION:



Simple appearing right ovarian cyst. This is almost certainly

benign and no follow-up is recommended.



Nonvisualization of the left ovary.

## 2019-10-11 NOTE — ER DOCUMENT REPORT
ED GI/





- General


Chief Complaint: Pelvic Pain


Stated Complaint: PELVIC PAIN


Time Seen by Provider: 10/11/19 18:49


Primary Care Provider: 


AHMET KNOX PA-C [Primary Care Provider] - Follow up as needed


Notes: 





Patient is a 34-year-old female that comes emergency department for chief 

complaint of pain in her right pelvic area there is occasionally very sharp, 

pain is been going on for the past 3 or 4 days now.  She does have a history of 

ovarian cyst and states this feels the same.  She denies vaginal bleeding or 

discharge, dysuria, flank pain, vomiting, fever, other locations of abdominal 

pain.  Past medical history of type 2 diabetes on metformin and Lantus, states 

she has not been taking her medications as prescribed.  She also has a history 

of B-cell lymphoma on her extremity which is just being watched after removal.  

She denies abdominal surgeries.


TRAVEL OUTSIDE OF THE [place] IN LAST 30 DAYS: No





- Related Data


Allergies/Adverse Reactions: 


                                        





No Known Allergies Allergy (Verified 05/08/17 11:18)


   











Past Medical History





- General


Information source: Patient





- Social History


Smoking Status: Current Every Day Smoker


Chew tobacco use (# tins/day): No


Frequency of alcohol use: None


Drug Abuse: None


Lives with: Family


Family History: Arthritis, DM, Hypertension, Malignancy.  denies: CAD, COPD, 

CVA, Hyperlipidemia, Thyroid Disfunction


Patient has suicidal ideation: No


Patient has homicidal ideation: No





- Past Medical History


Cardiac Medical History: Reports: Hx Hypertension


Pulmonary Medical History: 


Neurological Medical History: 


Endocrine Medical History: Reports: Hx Diabetes Mellitus Type 2


Renal/ Medical History: Denies: Hx Peritoneal Dialysis


GI Medical History: Reports: Hx Gastroesophageal Reflux Disease


Musculoskeletal Medical History: Reports Hx Arthritis, Reports Hx Gout, Reports 

Hx Musculoskeletal Deformity, Reports Hx Musculoskeletal Trauma


Psychiatric Medical History: 


   Denies: Hx Depression


Traumatic Medical History: Reports: Hx Fractures - Humerus


Past Surgical History: Reports: Hx Oral Surgery - [place] teeth, Hx Orthopedic 

Surgery - Back in 2013.  Fractured humerus repair fatty tumor from right leg





- Immunizations


Immunizations up to date: Yes


Hx Diphtheria, Pertussis, Tetanus Vaccination: Yes





Review of Systems





- Review of Systems


Constitutional: No symptoms reported


EENT: No symptoms reported


Cardiovascular: No symptoms reported


Respiratory: No symptoms reported


Gastrointestinal: See HPI


Genitourinary: See HPI


Female Genitourinary: See HPI


Musculoskeletal: No symptoms reported


Skin: No symptoms reported


Hematologic/Lymphatic: No symptoms reported


Neurological/Psychological: No symptoms reported





Physical Exam





- Vital signs


Vitals: 


                                        











Temp Pulse Resp BP Pulse Ox


 


 98.7 F   88   18   176/98 H  98 


 


 10/11/19 18:36  10/11/19 18:36  10/11/19 18:36  10/11/19 18:36  10/11/19 18:36














- Notes


Notes: 





GENERAL: Alert, interacts well. No acute distress.


HEAD: Normocephalic, atraumatic.


EYES: Pupils equal, round, and reactive to light. Extraocular movements intact.


ENT: Oral mucosa moist, tongue midline. Oropharynx unremarkable. Airway patent.


LUNGS: Clear to auscultation bilaterally, no wheezes, rales, or rhonchi. No 

respiratory distress.


HEART: Regular rate and rhythm. No murmur


ABDOMEN: Minimal right pelvic area tenderness, no specific McBurney's 

tenderness, very benign abdomen otherwise, no guarding, no rigidity, no rebound 

tenderness.


GENITOURINARY: Deferred


EXTREMITIES: Moves all 4 extremities spontaneously. No edema, normal radial and 

dorsalis pedis pulses bilaterally. No cyanosis.


BACK: no cervical, thoracic, lumbar midline tenderness. No saddle anesthesia, 

normal distal neurovascular exam. Moves all extremities in full range of motion.


NEUROLOGICAL: Alert and oriented x3. Normal speech. Cranial nerves II through 

XII grossly intact. 


PSYCH: Normal affect, normal mood.


SKIN: Warm, dry, normal turgor. No rashes or lesions noted.





Course





- Re-evaluation


Re-evalutation: 


CBC unremarkable.  Chemistry shows hyperglycemia without acidosis.  Urinalysis 

shows dehydration and glucose.  Ultrasound from triage reviewed and shows right-

sided cyst which is small and not complex.  No evidence of torsion, good blood 

supply.  No other concerning findings.





I did discuss patient's poor glucose control and blood pressure, she states that

she will try to do better and follow-up with primary care in regard to this.  

She was given IV fluids.





Patient is well-appearing on exam, mild right-sided pelvic tenderness, discussed

pelvic exam but this was declined.  No follow-up was recommended, patient was 

given some symptom management, discussed primary care follow-up and return 

precautions.  Patient states appreciation and agreement.  Stable time of 

discharge.





- Vital Signs


Vital signs: 


                                        











Temp Pulse Resp BP Pulse Ox


 


 97.7 F   74   21 H  145/98 H  99 


 


 10/11/19 22:05  10/11/19 22:05  10/11/19 22:05  10/11/19 22:05  10/11/19 22:05














- Laboratory


Result Diagrams: 


                                 10/11/19 19:11





                                 10/11/19 19:11


Laboratory results interpreted by me: 


                                        











  10/11/19 10/11/19 10/11/19





  19:11 19:11 20:00


 


WBC  12.4 H  


 


Sodium   135.5 L 


 


Creatinine   0.40 L 


 


Glucose   309 H 


 


Urine Protein    100 H


 


Urine Glucose (UA)    >=500 H


 


Urine Ketones    TRACE H


 


Urine Urobilinogen    2.0 H














Discharge





- Discharge


Clinical Impression: 


 Pelvic pain





Ovarian cyst


Qualifiers:


 Laterality: right Qualified Code(s): N83.201 - Unspecified ovarian cyst, right 

side





Condition: Stable


Disposition: HOME, SELF-CARE


Additional Instructions: 


There is a 2 cm ovarian cyst on the right side and the location of your pain.  

This is most likely the cause of your pain.  This does not require follow-up and

should resolve with time.  Take the provided pain medication if needed 

especially to help you sleep, otherwise take Tylenol or ibuprofen.





Your work-up also shows elevated blood sugar and dehydration, you have been 

rehydrated, resume your normal prescribed medications for diabetes.  Follow-up 

with primary care for additional management.





Return if you worsen including severe pain, vomiting, fever, or any other 

concerning symptoms.


Referrals: 


AHMET KNOX PA-C [Primary Care Provider] - Follow up as needed

## 2019-10-21 ENCOUNTER — HOSPITAL ENCOUNTER (OUTPATIENT)
Dept: HOSPITAL 62 - SP | Age: 34
End: 2019-10-21
Attending: PHYSICIAN ASSISTANT
Payer: COMMERCIAL

## 2019-10-21 DIAGNOSIS — R10.31: ICD-10-CM

## 2019-10-21 DIAGNOSIS — R09.89: Primary | ICD-10-CM

## 2019-10-21 PROCEDURE — 76881 US COMPL JOINT R-T W/IMG: CPT

## 2019-10-21 PROCEDURE — 93925 LOWER EXTREMITY STUDY: CPT

## 2019-10-22 NOTE — RADIOLOGY REPORT (SQ)
EXAM DESCRIPTION:  U/S EXTREMITY NONVASCULAR COMP



COMPLETED DATE/TIME:  10/21/2019 5:49 pm



REASON FOR STUDY:  R10.31 RIGHT LOWER QUADRANT PAIN R09.89  OTH SYMPTOMS AND SIGNS INVOLVING THE CIRC
 AND RESP SY R10.31  RIGHT LOWER QUADRANT PAIN



COMPARISON:  None.



TECHNIQUE:  Dynamic and static grayscale images acquired of the localized site of clinical concern an
d recorded on PACS. Additional selected color Doppler and spectral images recorded.

SITE OF CONCERN: Right lower quadrant of the abdomen/Right on region.



LIMITATIONS:  None.



FINDINGS:  The right lower quadrant of the abdomen was scanned.  No evidence of fascia defect to sugg
est hernia.

The right inguinal region was also scanned.  Several lymph nodes are identified which measure 2.9 x 1
.7 x 1.0 cm, 1.7 x 2 0.1 x 0.8 cm x 1.8 x 1.3 x 0.7 cm.  The lymph nodes contain a hilus of fat and l
ikely on a benign reactive basis.  These findings correlate to the CT pelvis examination dated 2/13/2
018.



IMPRESSION:  1.  No evidence of hernia in the right lower quadrant of the abdomen.

2.  Several lymph nodes in the right inguinal region as above, findings correlate to the CT pelvis ex
amination dated 2/13/2018.  These findings may be on a benign reactive basis.



TECHNICAL DOCUMENTATION:  JOB ID:  [id number]

 2011 [employer]- All Rights Reserved



Reading location - IP/workstation name: [email or web address]

## 2020-03-26 ENCOUNTER — HOSPITAL ENCOUNTER (EMERGENCY)
Dept: HOSPITAL 62 - ER | Age: 35
Discharge: HOME | End: 2020-03-26
Payer: COMMERCIAL

## 2020-03-26 VITALS — DIASTOLIC BLOOD PRESSURE: 88 MMHG | SYSTOLIC BLOOD PRESSURE: 149 MMHG

## 2020-03-26 DIAGNOSIS — Z91.14: ICD-10-CM

## 2020-03-26 DIAGNOSIS — F17.200: ICD-10-CM

## 2020-03-26 DIAGNOSIS — Z83.3: ICD-10-CM

## 2020-03-26 DIAGNOSIS — T38.3X6A: ICD-10-CM

## 2020-03-26 DIAGNOSIS — E11.65: ICD-10-CM

## 2020-03-26 DIAGNOSIS — R11.0: ICD-10-CM

## 2020-03-26 DIAGNOSIS — L03.314: ICD-10-CM

## 2020-03-26 DIAGNOSIS — I10: ICD-10-CM

## 2020-03-26 DIAGNOSIS — L02.214: Primary | ICD-10-CM

## 2020-03-26 LAB
ADD MANUAL DIFF: NO
ANION GAP SERPL CALC-SCNC: 12 MMOL/L (ref 5–19)
BASOPHILS # BLD AUTO: 0.1 10^3/UL (ref 0–0.2)
BASOPHILS NFR BLD AUTO: 0.7 % (ref 0–2)
BUN SERPL-MCNC: 10 MG/DL (ref 7–20)
CALCIUM: 9.1 MG/DL (ref 8.4–10.2)
CHLORIDE SERPL-SCNC: 100 MMOL/L (ref 98–107)
CO2 SERPL-SCNC: 23 MMOL/L (ref 22–30)
EOSINOPHIL # BLD AUTO: 0.2 10^3/UL (ref 0–0.6)
EOSINOPHIL NFR BLD AUTO: 1.6 % (ref 0–6)
ERYTHROCYTE [DISTWIDTH] IN BLOOD BY AUTOMATED COUNT: 12.7 % (ref 11.5–14)
GLUCOSE SERPL-MCNC: 286 MG/DL (ref 75–110)
HCT VFR BLD CALC: 43.5 % (ref 36–47)
HGB BLD-MCNC: 15.2 G/DL (ref 12–15.5)
LYMPHOCYTES # BLD AUTO: 4 10^3/UL (ref 0.5–4.7)
LYMPHOCYTES NFR BLD AUTO: 27.7 % (ref 13–45)
MCH RBC QN AUTO: 31.3 PG (ref 27–33.4)
MCHC RBC AUTO-ENTMCNC: 34.9 G/DL (ref 32–36)
MCV RBC AUTO: 90 FL (ref 80–97)
MONOCYTES # BLD AUTO: 0.7 10^3/UL (ref 0.1–1.4)
MONOCYTES NFR BLD AUTO: 5.2 % (ref 3–13)
NEUTROPHILS # BLD AUTO: 9.3 10^3/UL (ref 1.7–8.2)
NEUTS SEG NFR BLD AUTO: 64.8 % (ref 42–78)
PLATELET # BLD: 214 10^3/UL (ref 150–450)
POTASSIUM SERPL-SCNC: 4 MMOL/L (ref 3.6–5)
RBC # BLD AUTO: 4.85 10^6/UL (ref 3.72–5.28)
TOTAL CELLS COUNTED % (AUTO): 100 %
WBC # BLD AUTO: 14.4 10^3/UL (ref 4–10.5)

## 2020-03-26 PROCEDURE — 10060 I&D ABSCESS SIMPLE/SINGLE: CPT

## 2020-03-26 PROCEDURE — 80048 BASIC METABOLIC PNL TOTAL CA: CPT

## 2020-03-26 PROCEDURE — 85025 COMPLETE CBC W/AUTO DIFF WBC: CPT

## 2020-03-26 PROCEDURE — 36415 COLL VENOUS BLD VENIPUNCTURE: CPT

## 2020-03-26 PROCEDURE — 96365 THER/PROPH/DIAG IV INF INIT: CPT

## 2020-03-26 PROCEDURE — 82962 GLUCOSE BLOOD TEST: CPT

## 2020-03-26 PROCEDURE — 84703 CHORIONIC GONADOTROPIN ASSAY: CPT

## 2020-03-26 PROCEDURE — 99283 EMERGENCY DEPT VISIT LOW MDM: CPT

## 2020-03-26 NOTE — ER DOCUMENT REPORT
ED Medical Screen (RME)





- General


Chief Complaint: Abscess


Stated Complaint: POSS ABSCESS ON RIGHT PELVIC


Primary Care Provider: 


AHMET KNOX PA-C [Primary Care Provider] - Follow up as needed


Notes: 





Patient is a 34-year-old white female with past medical history of diabetes and 

peripheral neuropathy who presents to the emergency department the chief 

complaint of abscess formation to the right inguinal region.  She states is been

a little less than a week now.  She reports last time she had some like this on 

her abdomen she had to be taken to surgery.  She states this 1 feels like a 

"double her".  She states red swollen and painful.  Reports that she does not 

take her medicines very regularly for diabetes.  She does not monitor her blood 

glucose.  Denies fever, nausea or vomiting.  No recent travel or known sick 

contacts.





I have treated and performed a rapid initial assessment of this patient.  A 

comprehensive ED assessment and evaluation of the patient, analysis of test 

results and completion of medical decision making process will be conducted by 

additional ED providers.





PHYSICAL EXAMINATION:





GENERAL: Well-appearing, well-nourished and in no acute distress.  A&Ox4.  

Answers questions appropriately.


TRAVEL OUTSIDE OF THE [place] IN LAST 30 DAYS: No





- Related Data


Allergies/Adverse Reactions: 


                                        





No Known Allergies Allergy (Verified 05/08/17 11:18)


   








Home Medications: Is supposed to take meds, but does not.





Past Medical History





- Past Medical History


Cardiac Medical History: Reports: Hx Hypertension


Pulmonary Medical History: 


Neurological Medical History: 


Endocrine Medical History: Reports: Hx Diabetes Mellitus Type 2


Renal/ Medical History: Denies: Hx Peritoneal Dialysis


GI Medical History: Reports: Hx Gastroesophageal Reflux Disease


Musculoskeltal Medical History: Reports Hx Arthritis, Reports Hx Gout, Reports 

Hx Musculoskeletal Deformity, Reports Hx Musculoskeletal Trauma


Psychiatric Medical History: 


   Denies: Hx Depression


Traumatic Medical History: Reports: Hx Fractures - Humerus


Past Surgical History: Reports: Hx Oral Surgery - [place] teeth, Hx Orthopedic 

Surgery - Back in 2013.  Fractured humerus repair fatty tumor from right leg





- Immunizations


Immunizations up to date: Yes


Hx Diphtheria, Pertussis, Tetanus Vaccination: Yes





Physical Exam





- Vital signs


Vitals: 





                                        











Temp Pulse Resp BP Pulse Ox


 


 99.2 F   111 H  18   169/104 H  92 


 


 03/26/20 19:30  03/26/20 19:30  03/26/20 19:30  03/26/20 19:30  03/26/20 19:30














Course





- Vital Signs


Vital signs: 





                                        











Temp Pulse Resp BP Pulse Ox


 


 99.2 F   111 H  18   169/104 H  92 


 


 03/26/20 19:30  03/26/20 19:30  03/26/20 19:30  03/26/20 19:30  03/26/20 19:30














Doctor's Discharge





- Discharge


Referrals: 


AHMET KNOX PA-C [Primary Care Provider] - Follow up as needed

## 2020-03-26 NOTE — ER DOCUMENT REPORT
ED Skin Rash/Insect Bite/Abscs





- General


Chief Complaint: Abscess


Stated Complaint: POSS ABSCESS ON RIGHT PELVIC


Time Seen by Provider: 03/26/20 20:37


Primary Care Provider: 


AHMET KNOX PA-C [Primary Care Provider] - 03/30/20


Notes: 





Patient is a 34-year-old female with a history of type 2 diabetes on metformin 

and Lantus that comes emergency department for chief complaint of 3 days of a 

developing painful, swollen, red area over the right inguinal aspect.  She 

states that this actually popped and started draining purulent material after 

arriving to the emergency department.  She denies any significant spreading 

redness from the area, denies fever, reports vague nausea.  She admits she is 

not compliant with her diabetes medications.  She does not use recreational 

drugs.  She denies pregnancy.  She denies any other complaints.  She states she 

has had a severe abscess on the abdomen with a large area of spreading infection

that required surgical debridement once in the past.


TRAVEL OUTSIDE OF THE [place] IN LAST 30 DAYS: No





- Related Data


Allergies/Adverse Reactions: 


                                        





No Known Allergies Allergy (Verified 05/08/17 11:18)


   








Home Medications: Is supposed to take meds, but does not.





Past Medical History





- General


Information source: Patient





- Social History


Smoking Status: Current Every Day Smoker


Frequency of alcohol use: None


Drug Abuse: None


Lives with: Family


Family History: Arthritis, DM, Hypertension, Malignancy.  denies: CAD, COPD, 

CVA, Hyperlipidemia, Thyroid Disfunction


Patient has suicidal ideation: No


Patient has homicidal ideation: No





- Past Medical History


Cardiac Medical History: Reports: Hx Hypertension


Pulmonary Medical History: 


Neurological Medical History: 


Endocrine Medical History: Reports: Hx Diabetes Mellitus Type 2


Renal/ Medical History: Denies: Hx Peritoneal Dialysis


GI Medical History: Reports: Hx Gastroesophageal Reflux Disease


Musculoskeletal Medical History: Reports Hx Arthritis, Reports Hx Gout, Reports 

Hx Musculoskeletal Deformity, Reports Hx Musculoskeletal Trauma


Psychiatric Medical History: 


   Denies: Hx Depression


Traumatic Medical History: Reports: Hx Fractures - Humerus


Past Surgical History: Reports: Hx Oral Surgery - [place] teeth, Hx Orthopedic 

Surgery - Back in 2013.  Fractured humerus repair fatty tumor from right leg





- Immunizations


Immunizations up to date: Yes


Hx Diphtheria, Pertussis, Tetanus Vaccination: Yes





Review of Systems





- Review of Systems


Constitutional: No symptoms reported


EENT: No symptoms reported


Cardiovascular: No symptoms reported


Respiratory: No symptoms reported


Gastrointestinal: No symptoms reported


Genitourinary: No symptoms reported


Female Genitourinary: No symptoms reported


Musculoskeletal: No symptoms reported


Skin: See HPI


Hematologic/Lymphatic: No symptoms reported


Neurological/Psychological: No symptoms reported





Physical Exam





- Vital signs


Vitals: 


                                        











Temp Pulse Resp BP Pulse Ox


 


 99.2 F   111 H  18   169/104 H  92 


 


 03/26/20 19:30  03/26/20 19:30  03/26/20 19:30  03/26/20 19:30  03/26/20 19:30














- Notes


Notes: 





GENERAL: Alert, interacts well. No acute distress.  Talkative and well-appearing


HEAD: Normocephalic, atraumatic.


EYES: Pupils equal, round, and reactive to light. Extraocular movements intact.


ENT: Oral mucosa moist, tongue midline. Oropharynx unremarkable. Airway patent. 

Nares patent, no nasal septal hematoma, TM's intact.


NECK: Full range of motion. Supple. Trachea midline.


LUNGS: Clear to auscultation bilaterally, no wheezes, rales, or rhonchi. No 

respiratory distress.


HEART: Regular rate and rhythm. No murmur


ABDOMEN: Soft, non-tender. Non-distended. Bowel sounds present in all 4 

quadrants.


GENITOURINARY: Right inguinal region with a fluctuant, mildly indurated, tender 

area in the mid groin with mild surrounding erythema.  No streaking away from 

the area.  No nearby lymphadenopathy noted.  No genital involvement.  Exam 

performed with Urszula MERCER at bedside.


EXTREMITIES: Moves all 4 extremities spontaneously. No edema, normal radial and 

dorsalis pedis pulses bilaterally. No cyanosis.


BACK: no cervical, thoracic, lumbar midline tenderness. No saddle anesthesia, 

normal distal neurovascular exam. 


NEUROLOGICAL: Alert and oriented x3. Normal speech. Cranial nerves II through 

XII grossly intact. 


PSYCH: Normal affect, normal mood.


SKIN: Warm, dry, normal turgor. No rashes or lesions noted.





Course





- Re-evaluation


Re-evalutation: 


Patient tachycardic on vital signs but not on my exam.  She is not febrile.  She

does have mild leukocytosis, she has hyperglycemia without acidosis, she is not 

pregnant.  She does have a small right inguinal abscess with some mild 

surrounding cellulitis but there is no streaking away from the area, there is no

significant area of cellulitis.  The area was drained easily with incision and 

drainage with moderate amount of pus, the area was packed after discussion with 

patient.  Patient is very well-appearing, talkative, nontoxic.  She is 

requesting to go home.  Patient will be trialed on oral antibiotics first, I 

discussed the great importance and terrible consequences of not taking her 

diabetes medications, discussed close follow-up, discussed strict return 

precautions.  Patient states understanding and agreement with plan.








- Vital Signs


Vital signs: 


                                        











Temp Pulse Resp BP Pulse Ox


 


 98.3 F   110 H  18   149/88 H  96 


 


 03/26/20 22:44  03/26/20 22:44  03/26/20 22:44  03/26/20 22:44  03/26/20 22:44














- Laboratory


Result Diagrams: 


                                 03/26/20 21:15





                                 03/26/20 21:15


Laboratory results interpreted by me: 


                                        











  03/26/20 03/26/20 03/26/20





  19:38 21:15 21:15


 


WBC   14.4 H 


 


Absolute Neuts (auto)   9.3 H 


 


Sodium    134.7 L


 


Creatinine    0.45 L


 


Glucose    286 H


 


POC Glucose  348 H  














Procedures





- Incision and Drainage


  ** right inguinal


Type: Single


Anesthetic type: Other - l.e.t.


Blade size: 11


I&D procedure: Shurclens applied, Iodoform packing placed, Sterile dressing 

applied


Incision Method: Incision made by scalpel


Amount/type of drainage: A few cc's of purulent drainage, small amount of bloody

drainage





Discharge





- Discharge


Clinical Impression: 


 Abscess





Cellulitis


Qualifiers:


 Site of cellulitis: unspecified site Qualified Code(s): L03.90 - Cellulitis, 

unspecified





Uncontrolled type II diabetes mellitus


Qualifiers:


 Glycemic state: with hyperglycemia Qualified Code(s): E11.65 - Type 2 diabetes 

mellitus with hyperglycemia





Condition: Stable


Disposition: HOME, SELF-CARE


Additional Instructions: 


The abscess has been drained.  Please take the antibiotics as prescribed to 

completion.  Keep the area clean, clean with soap and water, apply absorbent 

dressing over the area.  Please take your metformin and Lantus for diabetes.  

Drink plenty of fluids.  Follow close with primary care for additional 

management.





Return if you worsen including developing or spreading redness, worsening pain, 

developing fever, vomiting, or any other concerning symptoms.


Prescriptions: 


Sulfamethoxazole/Trimethoprim [Bactrim Ds Tablet] 1 each PO BID #14 tablet


Cephalexin Monohydrate [Keflex 500 mg Capsule] 500 mg PO QID #28 capsule


Forms:  Return to Work


Referrals: 


AHMET KNOX PA-C [Primary Care Provider] - 03/30/20

## 2022-11-30 NOTE — ER DOCUMENT REPORT
Called [facility] informed them Emily denied refill on Lidocaine.  Patient needs appointment if syptoms persistent   ED Medical Screen (RME)





- General


Chief Complaint: Abscess


Stated Complaint: POSSIBLE ABSCESS


Time Seen by Provider: 02/12/18 19:56


Notes: 





pt states has "sore" near rectum, has had fever and "sugars are high".


TRAVEL OUTSIDE OF THE [place] IN LAST 30 DAYS: No





- Related Data


Allergies/Adverse Reactions: 


 





No Known Allergies Allergy (Verified 05/08/17 11:18)


 











Past Medical History





- Social History


Chew tobacco use (# tins/day): No


Drug Abuse: None


Endocrine Medical History: Reports: Hx Diabetes Mellitus Type 2


Renal/ Medical History: Denies: Hx Peritoneal Dialysis


GI Medical History: Reports: Hx Gastroesophageal Reflux Disease


Musculoskeltal Medical History: Reports Hx Arthritis, Reports Hx Gout, Reports 

Hx Musculoskeletal Deformity, Reports Hx Musculoskeletal Trauma


Traumatic Medical History: Reports: Hx Fractures - Humerus


Past Surgical History: Reports: Hx Oral Surgery - [place] teeth, Hx Orthopedic 

Surgery - Back in 2013.  Fractured humerus repair fatty tumor from right leg





- Immunizations


Immunizations up to date: Yes


Hx Diphtheria, Pertussis, Tetanus Vaccination: Yes





Physical Exam





- Vital signs


Vitals: 





 











Temp Pulse Resp BP Pulse Ox


 


 99.4 F   126 H  18   152/106 H  96 


 


 02/12/18 19:51  02/12/18 19:51  02/12/18 19:51  02/12/18 19:51  02/12/18 19:51














Course





- Vital Signs


Vital signs: 





 











Temp Pulse Resp BP Pulse Ox


 


 99.4 F   126 H  18   152/106 H  96 


 


 02/12/18 19:51  02/12/18 19:51  02/12/18 19:51  02/12/18 19:51  02/12/18 19:51